# Patient Record
Sex: FEMALE | Race: WHITE | NOT HISPANIC OR LATINO | Employment: FULL TIME | URBAN - METROPOLITAN AREA
[De-identification: names, ages, dates, MRNs, and addresses within clinical notes are randomized per-mention and may not be internally consistent; named-entity substitution may affect disease eponyms.]

---

## 2023-03-06 LAB
ANION GAP IN SER/PLAS: 10 MMOL/L (ref 10–20)
APPEARANCE, URINE: NORMAL
BILIRUBIN, URINE: NEGATIVE
BLOOD, URINE: NEGATIVE
CALCIUM (MG/DL) IN SER/PLAS: 10 MG/DL (ref 8.6–10.3)
CARBON DIOXIDE, TOTAL (MMOL/L) IN SER/PLAS: 27 MMOL/L (ref 21–32)
CHLORIDE (MMOL/L) IN SER/PLAS: 108 MMOL/L (ref 98–107)
COLOR, URINE: YELLOW
CREATININE (MG/DL) IN SER/PLAS: 1.21 MG/DL (ref 0.5–1.05)
CREATININE (MG/DL) IN URINE: 179 MG/DL (ref 20–320)
GFR FEMALE: 57 ML/MIN/1.73M2
GLUCOSE (MG/DL) IN SER/PLAS: 87 MG/DL (ref 74–99)
GLUCOSE, URINE: NEGATIVE MG/DL
KETONES, URINE: NEGATIVE MG/DL
LEUKOCYTE ESTERASE, URINE: NEGATIVE
NITRITE, URINE: NEGATIVE
PH, URINE: 7 (ref 5–8)
POTASSIUM (MMOL/L) IN SER/PLAS: 4.6 MMOL/L (ref 3.5–5.3)
PROTEIN (MG/DL) IN URINE: 10 MG/DL (ref 5–24)
PROTEIN, URINE: NEGATIVE MG/DL
PROTEIN/CREATININE (MG/MG) IN URINE: 0.06 MG/MG CREAT (ref 0–0.17)
SODIUM (MMOL/L) IN SER/PLAS: 140 MMOL/L (ref 136–145)
SPECIFIC GRAVITY, URINE: 1.02 (ref 1–1.03)
UREA NITROGEN (MG/DL) IN SER/PLAS: 11 MG/DL (ref 6–23)
UROBILINOGEN, URINE: <2 MG/DL (ref 0–1.9)

## 2023-03-13 DIAGNOSIS — R73.03 PREDIABETES: Primary | ICD-10-CM

## 2023-03-13 RX ORDER — SEMAGLUTIDE 1.34 MG/ML
INJECTION, SOLUTION SUBCUTANEOUS
Qty: 3 ML | Refills: 11 | Status: SHIPPED | OUTPATIENT
Start: 2023-03-13 | End: 2023-04-17 | Stop reason: SDUPTHER

## 2023-04-13 PROBLEM — N81.4 UTEROVAGINAL PROLAPSE: Status: ACTIVE | Noted: 2023-04-13

## 2023-04-13 PROBLEM — N81.6 RECTOCELE, FEMALE: Status: ACTIVE | Noted: 2023-04-13

## 2023-04-13 PROBLEM — K59.09 CHRONIC CONSTIPATION: Status: ACTIVE | Noted: 2023-04-13

## 2023-04-13 PROBLEM — N81.11 MIDLINE CYSTOCELE: Status: ACTIVE | Noted: 2023-04-13

## 2023-04-13 PROBLEM — R94.4 DECREASED GFR: Status: ACTIVE | Noted: 2023-04-13

## 2023-04-13 PROBLEM — F41.1 GENERALIZED ANXIETY DISORDER: Status: ACTIVE | Noted: 2023-04-13

## 2023-04-13 PROBLEM — R60.0 LEG EDEMA: Status: ACTIVE | Noted: 2023-04-13

## 2023-04-13 PROBLEM — M79.10 MYALGIA: Status: ACTIVE | Noted: 2023-04-13

## 2023-04-13 PROBLEM — M25.519 CHRONIC SHOULDER PAIN: Status: ACTIVE | Noted: 2023-04-13

## 2023-04-13 PROBLEM — K21.9 GERD (GASTROESOPHAGEAL REFLUX DISEASE): Status: ACTIVE | Noted: 2023-04-13

## 2023-04-13 PROBLEM — G47.33 OBSTRUCTIVE SLEEP APNEA ON CPAP: Status: ACTIVE | Noted: 2023-04-13

## 2023-04-13 PROBLEM — R76.8 POSITIVE ANA (ANTINUCLEAR ANTIBODY): Status: ACTIVE | Noted: 2023-04-13

## 2023-04-13 PROBLEM — R32 INCONTINENCE IN FEMALE: Status: ACTIVE | Noted: 2023-04-13

## 2023-04-13 PROBLEM — E16.1 HYPERINSULINEMIA: Status: ACTIVE | Noted: 2023-04-13

## 2023-04-13 PROBLEM — R10.2 PAIN IN PELVIS: Status: ACTIVE | Noted: 2023-04-13

## 2023-04-13 PROBLEM — M54.50 CHRONIC LOW BACK PAIN: Status: ACTIVE | Noted: 2023-04-13

## 2023-04-13 PROBLEM — E55.9 VITAMIN D DEFICIENCY: Status: ACTIVE | Noted: 2023-04-13

## 2023-04-13 PROBLEM — N18.30 STAGE 3 CHRONIC KIDNEY DISEASE (MULTI): Status: ACTIVE | Noted: 2023-04-13

## 2023-04-13 PROBLEM — M79.643 HAND PAIN: Status: ACTIVE | Noted: 2023-04-13

## 2023-04-13 PROBLEM — G89.29 CHRONIC LOW BACK PAIN: Status: ACTIVE | Noted: 2023-04-13

## 2023-04-13 PROBLEM — G89.29 CHRONIC SHOULDER PAIN: Status: ACTIVE | Noted: 2023-04-13

## 2023-04-13 PROBLEM — E53.8 VITAMIN B12 DEFICIENCY: Status: ACTIVE | Noted: 2023-04-13

## 2023-04-13 PROBLEM — R25.2 MUSCLE CRAMPS: Status: ACTIVE | Noted: 2023-04-13

## 2023-04-13 PROBLEM — I10 BENIGN ESSENTIAL HYPERTENSION: Status: ACTIVE | Noted: 2023-04-13

## 2023-04-13 PROBLEM — E66.9 OBESITY WITH BODY MASS INDEX (BMI) OF 30.0 TO 39.9: Status: ACTIVE | Noted: 2023-04-13

## 2023-04-13 PROBLEM — M25.551 CHRONIC PAIN OF RIGHT HIP: Status: ACTIVE | Noted: 2023-04-13

## 2023-04-13 PROBLEM — G43.909 MIGRAINE HEADACHE: Status: ACTIVE | Noted: 2023-04-13

## 2023-04-13 PROBLEM — G89.29 CHRONIC PAIN OF RIGHT HIP: Status: ACTIVE | Noted: 2023-04-13

## 2023-04-13 PROBLEM — M25.50 POLYARTHRALGIA: Status: ACTIVE | Noted: 2023-04-13

## 2023-04-13 RX ORDER — HYDROXYCHLOROQUINE SULFATE 200 MG/1
1 TABLET, FILM COATED ORAL DAILY
COMMUNITY
Start: 2023-02-21 | End: 2024-04-29 | Stop reason: WASHOUT

## 2023-04-13 RX ORDER — ACYCLOVIR 200 MG/1
200 CAPSULE ORAL
COMMUNITY
Start: 2023-01-19 | End: 2024-04-29 | Stop reason: WASHOUT

## 2023-04-13 RX ORDER — SEMAGLUTIDE 1.34 MG/ML
INJECTION, SOLUTION SUBCUTANEOUS
COMMUNITY
Start: 2022-08-26 | End: 2023-04-15 | Stop reason: DRUGHIGH

## 2023-04-13 RX ORDER — BACLOFEN 20 MG
TABLET ORAL
COMMUNITY

## 2023-04-13 RX ORDER — LOSARTAN POTASSIUM 50 MG/1
1 TABLET ORAL DAILY
COMMUNITY
Start: 2022-12-11 | End: 2023-04-17 | Stop reason: SDUPTHER

## 2023-04-15 PROBLEM — G89.29 CHRONIC PAIN OF LEFT KNEE: Status: ACTIVE | Noted: 2023-01-04

## 2023-04-15 PROBLEM — M25.562 CHRONIC PAIN OF LEFT KNEE: Status: ACTIVE | Noted: 2023-01-04

## 2023-04-15 PROBLEM — M35.9 UNDIFFERENTIATED CONNECTIVE TISSUE DISEASE (MULTI): Status: ACTIVE | Noted: 2023-01-04

## 2023-04-15 NOTE — PROGRESS NOTES
"Subjective   Patient ID: Delores Grijalva is a 43 y.o. female who presents for Annual Exam.  She has lost 36 lbs in the past 11 months since starting Ozempic for prediabetes.     She is concerned about chronic ammonia exposure at work. 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         Current Outpatient Medications   Medication Sig Dispense Refill    acyclovir (Zovirax) 200 mg capsule Take 1 capsule (200 mg) by mouth 5 times a day.      magnesium oxide 500 mg tablet Take by mouth.      diclofenac sodium 1 % kit Apply 4 g topically in the morning and 4 g at noon and 4 g in the evening and 4 g before bedtime.      hydroxychloroquine (Plaquenil) 200 mg tablet Take 1 tablet (200 mg) by mouth once daily.      losartan (Cozaar) 25 mg tablet Take 1 tablet (25 mg) by mouth once daily. 90 tablet 3    semaglutide (Ozempic) 1 mg/dose (4 mg/3 mL) pen injector Inject 1 mg under the skin 1 (one) time per week. 3 mL 11     No current facility-administered medications for this visit.     Past Surgical History:   Procedure Laterality Date    OTHER SURGICAL HISTORY  09/25/2019    Hysterectomy    OTHER SURGICAL HISTORY  02/09/2022    Kidney biopsy     No family history on file.   Social History     Tobacco Use    Smoking status: Never    Smokeless tobacco: Never   Vaping Use    Vaping status: Never Used     Passive vaping exposure: Yes   Substance Use Topics    Alcohol use: Never    Drug use: Never        Objective     Visit Vitals  /84 (BP Location: Left arm, Patient Position: Sitting, BP Cuff Size: Adult)   Pulse 68   Temp 36.4 °C (97.6 °F) (Temporal)   Resp 16   Ht 1.626 m (5' 4\")   Wt 79.8 kg (176 lb)   SpO2 99%   BMI 30.21 kg/m²   Smoking Status Never   BSA 1.9 m²        Constitutional: Well nourished, well developed, appears stated age  Eyes: no scleral icterus, no conjunctival injection  Ears: normal external auditory canal, no retraction or bulging of tympanic " membranes. Possible serous effusion left TM  Neck: no thyromegaly  Cardiovascular: regular rate and rhythm, no leg edema  Respiratory: normal respiratory effort, clear to auscultation bilaterally  Musculoskeletal: No gross deformities appreciated  Skin: Warm, dry. No rashes  Neurologic: Alert, CNs II-XII grossly intact..  Psych: Appropriate mood and affect.        Assessment/Plan   Problem List Items Addressed This Visit          Circulatory    Benign essential hypertension     Controlled on losartan, continue         Relevant Medications    losartan (Cozaar) 25 mg tablet    Other Relevant Orders    TSH with reflex to Free T4 if abnormal (Completed)    Basic metabolic panel (Completed)       Genitourinary    Stage 3 chronic kidney disease (CMS/HCC)     She would prefer not to keep seeing nephrology since workup for CKD etiology was negative.  I will monitor her GFR twice a year.         Relevant Orders    Basic metabolic panel (Completed)       Endocrine/Metabolic    Prediabetes    Relevant Medications    semaglutide (Ozempic) 1 mg/dose (4 mg/3 mL) pen injector    Other Relevant Orders    TSH with reflex to Free T4 if abnormal (Completed)    Hemoglobin A1c    Class 1 obesity due to excess calories with serious comorbidity and body mass index (BMI) of 30.0 to 30.9 in adult     She has lost 36 lbs in the past 11 months since starting Ozempic for prediabetes          Other Visit Diagnoses       Annual physical exam    -  Primary    Baseline mammogram done 2021    Relevant Orders    Hemoglobin A1c    Environmental exposure        Relevant Orders    Ammonia (Completed)            Follow up 6 months.    Shania Rincon,

## 2023-04-15 NOTE — PATIENT INSTRUCTIONS
Thank you for choosing [facility] for your healthcare.   As always if you have any questions or concerns please do not hesitate to call our office at [phone] or through [employer].    Have a great day!  Shania Rincon, DO

## 2023-04-17 ENCOUNTER — OFFICE VISIT (OUTPATIENT)
Dept: PRIMARY CARE | Facility: CLINIC | Age: 43
End: 2023-04-17
Payer: COMMERCIAL

## 2023-04-17 ENCOUNTER — LAB (OUTPATIENT)
Dept: LAB | Facility: LAB | Age: 43
End: 2023-04-17
Payer: COMMERCIAL

## 2023-04-17 VITALS
HEIGHT: 64 IN | TEMPERATURE: 97.6 F | HEART RATE: 68 BPM | OXYGEN SATURATION: 99 % | DIASTOLIC BLOOD PRESSURE: 84 MMHG | WEIGHT: 176 LBS | SYSTOLIC BLOOD PRESSURE: 123 MMHG | BODY MASS INDEX: 30.05 KG/M2 | RESPIRATION RATE: 16 BRPM

## 2023-04-17 DIAGNOSIS — T75.89XA ENVIRONMENTAL EXPOSURE: ICD-10-CM

## 2023-04-17 DIAGNOSIS — Z00.00 ANNUAL PHYSICAL EXAM: ICD-10-CM

## 2023-04-17 DIAGNOSIS — Z00.00 ANNUAL PHYSICAL EXAM: Primary | ICD-10-CM

## 2023-04-17 DIAGNOSIS — N18.31 STAGE 3A CHRONIC KIDNEY DISEASE (MULTI): ICD-10-CM

## 2023-04-17 DIAGNOSIS — E66.09 CLASS 1 OBESITY DUE TO EXCESS CALORIES WITH SERIOUS COMORBIDITY AND BODY MASS INDEX (BMI) OF 30.0 TO 30.9 IN ADULT: ICD-10-CM

## 2023-04-17 DIAGNOSIS — I10 BENIGN ESSENTIAL HYPERTENSION: ICD-10-CM

## 2023-04-17 DIAGNOSIS — R73.03 PREDIABETES: ICD-10-CM

## 2023-04-17 PROBLEM — R60.0 EDEMA OF LOWER EXTREMITY: Status: ACTIVE | Noted: 2023-04-17

## 2023-04-17 PROBLEM — M25.519 SHOULDER PAIN: Status: ACTIVE | Noted: 2023-04-17

## 2023-04-17 PROBLEM — R32 URINARY INCONTINENCE: Status: ACTIVE | Noted: 2023-04-17

## 2023-04-17 PROBLEM — E66.811 CLASS 1 OBESITY DUE TO EXCESS CALORIES WITH SERIOUS COMORBIDITY AND BODY MASS INDEX (BMI) OF 30.0 TO 30.9 IN ADULT: Status: ACTIVE | Noted: 2023-04-17

## 2023-04-17 PROBLEM — R60.0 LEG EDEMA: Status: RESOLVED | Noted: 2023-04-13 | Resolved: 2023-04-17

## 2023-04-17 PROBLEM — R94.4 DECREASED GFR: Status: RESOLVED | Noted: 2023-04-13 | Resolved: 2023-04-17

## 2023-04-17 PROBLEM — R20.0 NUMBNESS OF HAND: Status: ACTIVE | Noted: 2023-04-17

## 2023-04-17 PROBLEM — K58.9 IRRITABLE BOWEL SYNDROME: Status: ACTIVE | Noted: 2023-04-17

## 2023-04-17 PROBLEM — R76.8 POSITIVE ANTINUCLEAR ANTIBODY: Status: ACTIVE | Noted: 2023-04-17

## 2023-04-17 PROBLEM — M79.603 PAIN IN UPPER LIMB: Status: ACTIVE | Noted: 2023-04-17

## 2023-04-17 LAB
AMMONIA (UMOL/L) IN PLASMA: 83 UMOL/L
ANION GAP IN SER/PLAS: 10 MMOL/L (ref 10–20)
CALCIUM (MG/DL) IN SER/PLAS: 9.6 MG/DL (ref 8.6–10.3)
CARBON DIOXIDE, TOTAL (MMOL/L) IN SER/PLAS: 25 MMOL/L (ref 21–32)
CHLORIDE (MMOL/L) IN SER/PLAS: 109 MMOL/L (ref 98–107)
CREATININE (MG/DL) IN SER/PLAS: 1.06 MG/DL (ref 0.5–1.05)
GFR FEMALE: 66 ML/MIN/1.73M2
GLUCOSE (MG/DL) IN SER/PLAS: 87 MG/DL (ref 74–99)
POTASSIUM (MMOL/L) IN SER/PLAS: 4.5 MMOL/L (ref 3.5–5.3)
SODIUM (MMOL/L) IN SER/PLAS: 139 MMOL/L (ref 136–145)
THYROTROPIN (MIU/L) IN SER/PLAS BY DETECTION LIMIT <= 0.05 MIU/L: 1.3 MIU/L (ref 0.44–3.98)
UREA NITROGEN (MG/DL) IN SER/PLAS: 11 MG/DL (ref 6–23)

## 2023-04-17 PROCEDURE — 1036F TOBACCO NON-USER: CPT | Performed by: FAMILY MEDICINE

## 2023-04-17 PROCEDURE — 99396 PREV VISIT EST AGE 40-64: CPT | Performed by: FAMILY MEDICINE

## 2023-04-17 PROCEDURE — 3079F DIAST BP 80-89 MM HG: CPT | Performed by: FAMILY MEDICINE

## 2023-04-17 PROCEDURE — 84443 ASSAY THYROID STIM HORMONE: CPT

## 2023-04-17 PROCEDURE — 83036 HEMOGLOBIN GLYCOSYLATED A1C: CPT

## 2023-04-17 PROCEDURE — 3074F SYST BP LT 130 MM HG: CPT | Performed by: FAMILY MEDICINE

## 2023-04-17 PROCEDURE — 3008F BODY MASS INDEX DOCD: CPT | Performed by: FAMILY MEDICINE

## 2023-04-17 PROCEDURE — 82140 ASSAY OF AMMONIA: CPT

## 2023-04-17 PROCEDURE — 36415 COLL VENOUS BLD VENIPUNCTURE: CPT

## 2023-04-17 PROCEDURE — 80048 BASIC METABOLIC PNL TOTAL CA: CPT

## 2023-04-17 RX ORDER — LOSARTAN POTASSIUM 25 MG/1
25 TABLET ORAL DAILY
Qty: 90 TABLET | Refills: 3 | Status: SHIPPED | OUTPATIENT
Start: 2023-04-17 | End: 2023-10-04 | Stop reason: SDUPTHER

## 2023-04-17 RX ORDER — SEMAGLUTIDE 1.34 MG/ML
1 INJECTION, SOLUTION SUBCUTANEOUS
Qty: 3 ML | Refills: 11 | Status: SHIPPED | OUTPATIENT
Start: 2023-04-17 | End: 2023-07-05 | Stop reason: DRUGHIGH

## 2023-04-17 SDOH — ECONOMIC STABILITY: FOOD INSECURITY: WITHIN THE PAST 12 MONTHS, YOU WORRIED THAT YOUR FOOD WOULD RUN OUT BEFORE YOU GOT MONEY TO BUY MORE.: NEVER TRUE

## 2023-04-17 SDOH — ECONOMIC STABILITY: FOOD INSECURITY: WITHIN THE PAST 12 MONTHS, THE FOOD YOU BOUGHT JUST DIDN'T LAST AND YOU DIDN'T HAVE MONEY TO GET MORE.: NEVER TRUE

## 2023-04-17 ASSESSMENT — PATIENT HEALTH QUESTIONNAIRE - PHQ9
SUM OF ALL RESPONSES TO PHQ9 QUESTIONS 1 & 2: 0
2. FEELING DOWN, DEPRESSED OR HOPELESS: NOT AT ALL
1. LITTLE INTEREST OR PLEASURE IN DOING THINGS: NOT AT ALL

## 2023-04-17 ASSESSMENT — LIFESTYLE VARIABLES
SKIP TO QUESTIONS 9-10: 1
HOW OFTEN DO YOU HAVE A DRINK CONTAINING ALCOHOL: NEVER
AUDIT-C TOTAL SCORE: 0
HOW MANY STANDARD DRINKS CONTAINING ALCOHOL DO YOU HAVE ON A TYPICAL DAY: PATIENT DOES NOT DRINK
HOW OFTEN DO YOU HAVE SIX OR MORE DRINKS ON ONE OCCASION: NEVER

## 2023-04-17 ASSESSMENT — PAIN SCALES - GENERAL: PAINLEVEL: 4

## 2023-04-18 ENCOUNTER — TELEPHONE (OUTPATIENT)
Dept: PRIMARY CARE | Facility: CLINIC | Age: 43
End: 2023-04-18
Payer: COMMERCIAL

## 2023-04-18 DIAGNOSIS — R79.89 INCREASED AMMONIA LEVEL: Primary | ICD-10-CM

## 2023-04-18 LAB
ESTIMATED AVERAGE GLUCOSE FOR HBA1C: 103 MG/DL
HEMOGLOBIN A1C/HEMOGLOBIN TOTAL IN BLOOD: 5.2 %

## 2023-04-18 NOTE — TELEPHONE ENCOUNTER
I want her to wear gloves at work constantly since we think that is the source of her exposure. The plan is to recheck the ammonia level in 1 week and I also added liver function tests to make sure her liver is working properly. Does not need to fast for the blood work. Thanks,  Shania Rincon, DO

## 2023-04-18 NOTE — TELEPHONE ENCOUNTER
Pt saw her bloodwork results on mychart and notice her ammonia levels are off. She is worried and is wondering what you recommend.

## 2023-04-18 NOTE — ASSESSMENT & PLAN NOTE
She would prefer not to keep seeing nephrology since workup for CKD etiology was negative.  I will monitor her GFR twice a year.

## 2023-04-24 ENCOUNTER — LAB (OUTPATIENT)
Dept: LAB | Facility: LAB | Age: 43
End: 2023-04-24
Payer: COMMERCIAL

## 2023-04-24 DIAGNOSIS — R79.89 INCREASED AMMONIA LEVEL: ICD-10-CM

## 2023-04-24 LAB
ALANINE AMINOTRANSFERASE (SGPT) (U/L) IN SER/PLAS: 14 U/L (ref 7–45)
ALBUMIN (G/DL) IN SER/PLAS: 4.2 G/DL (ref 3.4–5)
ALKALINE PHOSPHATASE (U/L) IN SER/PLAS: 48 U/L (ref 33–110)
AMMONIA (UMOL/L) IN PLASMA: 31 UMOL/L
ASPARTATE AMINOTRANSFERASE (SGOT) (U/L) IN SER/PLAS: 16 U/L (ref 9–39)
BILIRUBIN DIRECT (MG/DL) IN SER/PLAS: 0.1 MG/DL (ref 0–0.3)
BILIRUBIN TOTAL (MG/DL) IN SER/PLAS: 0.7 MG/DL (ref 0–1.2)
PROTEIN TOTAL: 6.2 G/DL (ref 6.4–8.2)

## 2023-04-24 PROCEDURE — 80076 HEPATIC FUNCTION PANEL: CPT

## 2023-04-24 PROCEDURE — 36415 COLL VENOUS BLD VENIPUNCTURE: CPT

## 2023-04-24 PROCEDURE — 82140 ASSAY OF AMMONIA: CPT

## 2023-05-01 ENCOUNTER — LAB (OUTPATIENT)
Dept: LAB | Facility: LAB | Age: 43
End: 2023-05-01
Payer: COMMERCIAL

## 2023-05-01 DIAGNOSIS — R79.89 INCREASED AMMONIA LEVEL: ICD-10-CM

## 2023-05-01 LAB — AMMONIA (UMOL/L) IN PLASMA: 31 UMOL/L

## 2023-05-01 PROCEDURE — 36415 COLL VENOUS BLD VENIPUNCTURE: CPT

## 2023-05-01 PROCEDURE — 82140 ASSAY OF AMMONIA: CPT

## 2023-05-02 ENCOUNTER — TELEPHONE (OUTPATIENT)
Dept: PRIMARY CARE | Facility: CLINIC | Age: 43
End: 2023-05-02
Payer: COMMERCIAL

## 2023-05-02 NOTE — TELEPHONE ENCOUNTER
----- Message from Shania Rincon DO sent at 5/1/2023  7:24 PM EDT -----  Repeat ammonia was normal and the same as 1 week ago - 31 (normal is less than 53). Thanks,  Shania Rincon DO

## 2023-06-07 ENCOUNTER — TELEPHONE (OUTPATIENT)
Dept: PRIMARY CARE | Facility: CLINIC | Age: 43
End: 2023-06-07
Payer: COMMERCIAL

## 2023-06-07 NOTE — TELEPHONE ENCOUNTER
Pt says that her BP has been very high lately especially in the morning, with the bottom number being the the 100's. Pt took an extra half last night. Today it was 169/109. Pt is wanting to know if she should take an extra half of a pill or what you would advise?

## 2023-06-07 NOTE — TELEPHONE ENCOUNTER
I recommend increasing her losartan to 2 tablets daily and to call us in a week with update on home BP readings. Thanks,  Shania Rincon, DO

## 2023-07-05 ENCOUNTER — TELEPHONE (OUTPATIENT)
Dept: PRIMARY CARE | Facility: CLINIC | Age: 43
End: 2023-07-05
Payer: COMMERCIAL

## 2023-07-05 VITALS — BODY MASS INDEX: 28.32 KG/M2 | WEIGHT: 165 LBS

## 2023-07-05 DIAGNOSIS — R73.03 PREDIABETES: ICD-10-CM

## 2023-07-05 DIAGNOSIS — R73.03 PREDIABETES: Primary | ICD-10-CM

## 2023-07-05 NOTE — TELEPHONE ENCOUNTER
Called patient, notified of message, patient expressed verbal understanding had no questions at this time. Patient said that her current weight is 165lbs.

## 2023-07-05 NOTE — TELEPHONE ENCOUNTER
2 mg dose submitted. Can you let her know and find out what her current weight is? Thanks,  Shania Rincon, DO

## 2023-07-05 NOTE — TELEPHONE ENCOUNTER
PATIENT SAID THAT YOU TOLD HER TO CALL IF SHE WANTED TO INCREASE THE DOSAGE OF HER OZEMPIC. PT WOULD LIKE TO INCREASE IT TO 2. [employer].

## 2023-07-05 NOTE — TELEPHONE ENCOUNTER
Prior authorization request received via fax for OZEMPIC    Form given to: PLACED IN YOUR BOX ON 7/5/23

## 2023-07-06 RX ORDER — SEMAGLUTIDE 1.34 MG/ML
1 INJECTION, SOLUTION SUBCUTANEOUS
Qty: 3 ML | Refills: 11 | Status: SHIPPED | OUTPATIENT
Start: 2023-07-06 | End: 2023-10-04 | Stop reason: SDUPTHER

## 2023-07-06 NOTE — TELEPHONE ENCOUNTER
Insurance denied the Ozempic 2 mg. Her insurance is now only covering Ozempic for patients with diabetes which she doesn't have. Her insurance approval for the 1 mg dose is still valid until the end of September. So she should be able to fill the 1 mg until that time and after that, he insurance will no longer cover the Ozempic at all for her.  Can you let her know? Thanks,  Shania Rincon, DO

## 2023-08-30 ENCOUNTER — TELEPHONE (OUTPATIENT)
Dept: PRIMARY CARE | Facility: CLINIC | Age: 43
End: 2023-08-30
Payer: COMMERCIAL

## 2023-08-30 NOTE — TELEPHONE ENCOUNTER
Prior authorization request received via fax for Ozempic     Form given to: Placed in  mailbox 8/30/23

## 2023-08-31 NOTE — TELEPHONE ENCOUNTER
PA done on CMM and denied because patient does not have diabetes. Patient previously made aware of this.  Shania Rincon, DO

## 2023-09-14 ENCOUNTER — TELEPHONE (OUTPATIENT)
Dept: PRIMARY CARE | Facility: CLINIC | Age: 43
End: 2023-09-14
Payer: COMMERCIAL

## 2023-09-14 NOTE — TELEPHONE ENCOUNTER
Prior authorization request received via fax for OZEMPIC    Form given to: PLACED IN YOUR BOX ON 9/14/23.

## 2023-09-28 ENCOUNTER — TELEPHONE (OUTPATIENT)
Dept: PRIMARY CARE | Facility: CLINIC | Age: 43
End: 2023-09-28
Payer: COMMERCIAL

## 2023-09-28 DIAGNOSIS — I10 BENIGN ESSENTIAL HYPERTENSION: Primary | ICD-10-CM

## 2023-09-28 DIAGNOSIS — R79.89 INCREASED AMMONIA LEVEL: ICD-10-CM

## 2023-09-28 NOTE — TELEPHONE ENCOUNTER
Patient is calling to see if lab orders can be placed.  Patient is asking if leo liver and ammonia levels along with kidney can be checked    NOV 10/4

## 2023-10-02 ENCOUNTER — LAB (OUTPATIENT)
Dept: LAB | Facility: LAB | Age: 43
End: 2023-10-02
Payer: COMMERCIAL

## 2023-10-02 DIAGNOSIS — R79.89 INCREASED AMMONIA LEVEL: ICD-10-CM

## 2023-10-02 DIAGNOSIS — I10 BENIGN ESSENTIAL HYPERTENSION: ICD-10-CM

## 2023-10-02 LAB
ALBUMIN SERPL BCP-MCNC: 4.1 G/DL (ref 3.4–5)
ALP SERPL-CCNC: 47 U/L (ref 33–110)
ALT SERPL W P-5'-P-CCNC: 12 U/L (ref 7–45)
AMMONIA PLAS-SCNC: 27 UMOL/L (ref 16–53)
ANION GAP SERPL CALC-SCNC: 9 MMOL/L (ref 10–20)
AST SERPL W P-5'-P-CCNC: 13 U/L (ref 9–39)
BILIRUB SERPL-MCNC: 0.5 MG/DL (ref 0–1.2)
BUN SERPL-MCNC: 11 MG/DL (ref 6–23)
CALCIUM SERPL-MCNC: 10 MG/DL (ref 8.6–10.3)
CHLORIDE SERPL-SCNC: 108 MMOL/L (ref 98–107)
CO2 SERPL-SCNC: 25 MMOL/L (ref 21–32)
CREAT SERPL-MCNC: 1.14 MG/DL (ref 0.5–1.05)
GFR SERPL CREATININE-BSD FRML MDRD: 61 ML/MIN/1.73M*2
GLUCOSE SERPL-MCNC: 87 MG/DL (ref 74–99)
POTASSIUM SERPL-SCNC: 4.1 MMOL/L (ref 3.5–5.3)
PROT SERPL-MCNC: 6.2 G/DL (ref 6.4–8.2)
SODIUM SERPL-SCNC: 138 MMOL/L (ref 136–145)

## 2023-10-02 PROCEDURE — 36415 COLL VENOUS BLD VENIPUNCTURE: CPT

## 2023-10-04 ENCOUNTER — OFFICE VISIT (OUTPATIENT)
Dept: PRIMARY CARE | Facility: CLINIC | Age: 43
End: 2023-10-04
Payer: COMMERCIAL

## 2023-10-04 VITALS
DIASTOLIC BLOOD PRESSURE: 80 MMHG | SYSTOLIC BLOOD PRESSURE: 126 MMHG | HEART RATE: 82 BPM | BODY MASS INDEX: 26.98 KG/M2 | TEMPERATURE: 97.2 F | HEIGHT: 64 IN | OXYGEN SATURATION: 98 % | WEIGHT: 158 LBS | RESPIRATION RATE: 16 BRPM

## 2023-10-04 DIAGNOSIS — R07.81 RIB PAIN ON LEFT SIDE: ICD-10-CM

## 2023-10-04 DIAGNOSIS — E66.3 OVERWEIGHT (BMI 25.0-29.9): ICD-10-CM

## 2023-10-04 DIAGNOSIS — R73.03 PREDIABETES: ICD-10-CM

## 2023-10-04 DIAGNOSIS — T75.3XXA MOTION SICKNESS, INITIAL ENCOUNTER: Primary | ICD-10-CM

## 2023-10-04 DIAGNOSIS — I10 BENIGN ESSENTIAL HYPERTENSION: ICD-10-CM

## 2023-10-04 PROBLEM — N18.2 STAGE 2 CHRONIC KIDNEY DISEASE: Chronic | Status: ACTIVE | Noted: 2023-04-13

## 2023-10-04 PROBLEM — R76.8 POSITIVE ANTINUCLEAR ANTIBODY: Status: RESOLVED | Noted: 2023-04-17 | Resolved: 2023-10-04

## 2023-10-04 PROBLEM — M25.532 LEFT WRIST PAIN: Status: ACTIVE | Noted: 2023-10-04

## 2023-10-04 PROBLEM — R60.0 LEG EDEMA: Status: ACTIVE | Noted: 2023-10-04

## 2023-10-04 PROBLEM — R60.0 LEG EDEMA: Status: RESOLVED | Noted: 2023-10-04 | Resolved: 2023-10-04

## 2023-10-04 PROBLEM — E66.9 OBESITY WITH BODY MASS INDEX (BMI) OF 30.0 TO 39.9: Status: RESOLVED | Noted: 2023-04-13 | Resolved: 2023-10-04

## 2023-10-04 PROBLEM — E66.09 CLASS 1 OBESITY DUE TO EXCESS CALORIES WITH SERIOUS COMORBIDITY AND BODY MASS INDEX (BMI) OF 30.0 TO 30.9 IN ADULT: Status: RESOLVED | Noted: 2023-04-17 | Resolved: 2023-10-04

## 2023-10-04 PROBLEM — N18.2 STAGE 2 CHRONIC KIDNEY DISEASE: Status: ACTIVE | Noted: 2023-04-13

## 2023-10-04 PROBLEM — E66.811 CLASS 1 OBESITY DUE TO EXCESS CALORIES WITH SERIOUS COMORBIDITY AND BODY MASS INDEX (BMI) OF 30.0 TO 30.9 IN ADULT: Status: RESOLVED | Noted: 2023-04-17 | Resolved: 2023-10-04

## 2023-10-04 PROBLEM — R94.4 DECREASED GFR: Status: ACTIVE | Noted: 2023-10-04

## 2023-10-04 PROBLEM — M77.8 TENDINITIS OF LEFT WRIST: Status: ACTIVE | Noted: 2023-10-04

## 2023-10-04 PROCEDURE — 3074F SYST BP LT 130 MM HG: CPT | Performed by: FAMILY MEDICINE

## 2023-10-04 PROCEDURE — 3079F DIAST BP 80-89 MM HG: CPT | Performed by: FAMILY MEDICINE

## 2023-10-04 PROCEDURE — 99214 OFFICE O/P EST MOD 30 MIN: CPT | Performed by: FAMILY MEDICINE

## 2023-10-04 PROCEDURE — 3008F BODY MASS INDEX DOCD: CPT | Performed by: FAMILY MEDICINE

## 2023-10-04 PROCEDURE — 1036F TOBACCO NON-USER: CPT | Performed by: FAMILY MEDICINE

## 2023-10-04 RX ORDER — SEMAGLUTIDE 1.34 MG/ML
1 INJECTION, SOLUTION SUBCUTANEOUS
Qty: 3 ML | Refills: 11 | Status: SHIPPED | OUTPATIENT
Start: 2023-10-04 | End: 2023-11-10

## 2023-10-04 RX ORDER — SCOLOPAMINE TRANSDERMAL SYSTEM 1 MG/1
1 PATCH, EXTENDED RELEASE TRANSDERMAL
Qty: 12 PATCH | Refills: 0 | Status: SHIPPED | OUTPATIENT
Start: 2023-10-04 | End: 2023-12-03

## 2023-10-04 RX ORDER — LOSARTAN POTASSIUM 25 MG/1
25 TABLET ORAL DAILY
Qty: 90 TABLET | Refills: 3 | Status: SHIPPED | OUTPATIENT
Start: 2023-10-04 | End: 2023-11-01

## 2023-10-04 SDOH — ECONOMIC STABILITY: FOOD INSECURITY: WITHIN THE PAST 12 MONTHS, THE FOOD YOU BOUGHT JUST DIDN'T LAST AND YOU DIDN'T HAVE MONEY TO GET MORE.: NEVER TRUE

## 2023-10-04 SDOH — ECONOMIC STABILITY: FOOD INSECURITY: WITHIN THE PAST 12 MONTHS, YOU WORRIED THAT YOUR FOOD WOULD RUN OUT BEFORE YOU GOT MONEY TO BUY MORE.: NEVER TRUE

## 2023-10-04 ASSESSMENT — LIFESTYLE VARIABLES
HOW OFTEN DO YOU HAVE A DRINK CONTAINING ALCOHOL: NEVER
HOW OFTEN DO YOU HAVE SIX OR MORE DRINKS ON ONE OCCASION: NEVER
SKIP TO QUESTIONS 9-10: 1
HOW MANY STANDARD DRINKS CONTAINING ALCOHOL DO YOU HAVE ON A TYPICAL DAY: PATIENT DOES NOT DRINK
AUDIT-C TOTAL SCORE: 0

## 2023-10-04 ASSESSMENT — PATIENT HEALTH QUESTIONNAIRE - PHQ9
1. LITTLE INTEREST OR PLEASURE IN DOING THINGS: NOT AT ALL
2. FEELING DOWN, DEPRESSED OR HOPELESS: NOT AT ALL
SUM OF ALL RESPONSES TO PHQ9 QUESTIONS 1 & 2: 0

## 2023-10-04 ASSESSMENT — PAIN SCALES - GENERAL: PAINLEVEL: 0-NO PAIN

## 2023-10-04 NOTE — PROGRESS NOTES
"Subjective   Patient ID: Delores Grijalva is a 43 y.o. female who presents for Follow-up (6 month follow-up. Going on cruise next month, asking about sea sickness.), Chest Pain (L side rib pain X 1 month.  Pain is 0/10 right now but can be 9/10 while active.), and Obesity (Would like to discuss weight loss progress. ).  Her insurance is no longer covering Ozmepic. She lost 54 lbs from 5/2022 to now with the medication.     She is having left lower posterior rib pain for about 1 month. It is most severe after longer work days. No injury.        Current Outpatient Medications   Medication Sig Dispense Refill    magnesium oxide 500 mg tablet Take by mouth.      acyclovir (Zovirax) 200 mg capsule Take 1 capsule (200 mg) by mouth 5 times a day.      hydroxychloroquine (Plaquenil) 200 mg tablet Take 1 tablet (200 mg) by mouth once daily.      losartan (Cozaar) 25 mg tablet Take 1 tablet (25 mg) by mouth once daily. 90 tablet 3    scopolamine (Transderm-Scop) 1 mg over 3 days patch 3 day Place 1 patch over 72 hours on the skin every 3rd day if needed (motion sickness). Apply 8-12 hours prior to boarding ship. 12 patch 0    semaglutide (Ozempic) 1 mg/dose (4 mg/3 mL) pen injector Inject 1 mg under the skin 1 (one) time per week. 3 mL 11     No current facility-administered medications for this visit.       Objective     Visit Vitals  /80 (BP Location: Left arm, Patient Position: Sitting, BP Cuff Size: Adult)   Pulse 82   Temp 36.2 °C (97.2 °F)   Resp 16   Ht 1.626 m (5' 4\")   Wt 71.7 kg (158 lb)   SpO2 98%   BMI 27.12 kg/m²   Smoking Status Never   BSA 1.8 m²        Constitutional: Well nourished, well developed, appears stated age  Eyes: no scleral icterus, no conjunctival injection  Ears: normal external auditory canal, no retraction or bulging of tympanic membranes  Neck: no thyromegaly  Cardiovascular: regular rate and rhythm, no leg edema  Respiratory: normal respiratory effort, clear to auscultation " bilaterally  Musculoskeletal: No gross deformities appreciated  Skin: Warm, dry. No rashes  Neurologic: Alert, CNs II-XII grossly intact..  Psych: Appropriate mood and affect.        Assessment/Plan   Problem List Items Addressed This Visit       Prediabetes     Controlled on Ozempic         Relevant Medications    semaglutide (Ozempic) 1 mg/dose (4 mg/3 mL) pen injector    Benign essential hypertension (Chronic)     Controlled on losartan, continue         Relevant Medications    losartan (Cozaar) 25 mg tablet    Overweight (BMI 25.0-29.9)     No longer obese  Has lost 54 lbs in the past 1.5 years since starting Ozempic 5/2022. No longer being covered by insurance.           Other Visit Diagnoses       Motion sickness, initial encounter    -  Primary    Relevant Medications    scopolamine (Transderm-Scop) 1 mg over 3 days patch 3 day    Rib pain on left side        Suspect musclar   Recommended stretching area prior to work day          All pertinent lab work and results were reviewed with patient.     Follow up 6 months.    Shania Rincon, DO

## 2023-10-04 NOTE — ASSESSMENT & PLAN NOTE
She would prefer not to keep seeing nephrology since workup for CKD etiology was negative.  I will monitor her GFR twice a year.  Improved from stage 3a to stage 2 on recent labs

## 2023-10-04 NOTE — PATIENT INSTRUCTIONS
Thank you for choosing [facility] for your healthcare.   As always if you have any questions or concerns please do not hesitate to call our office at [phone] or through [facility].    Have a great day!  Shania Rincon, DO

## 2023-10-04 NOTE — ASSESSMENT & PLAN NOTE
No longer obese  Has lost 54 lbs in the past 1.5 years since starting Ozempic 5/2022. No longer being covered by insurance.

## 2023-10-23 ENCOUNTER — TELEPHONE (OUTPATIENT)
Dept: PRIMARY CARE | Facility: CLINIC | Age: 43
End: 2023-10-23
Payer: COMMERCIAL

## 2023-11-01 DIAGNOSIS — I10 BENIGN ESSENTIAL HYPERTENSION: ICD-10-CM

## 2023-11-01 RX ORDER — LOSARTAN POTASSIUM 25 MG/1
25 TABLET ORAL DAILY
Qty: 90 TABLET | Refills: 3 | Status: SHIPPED | OUTPATIENT
Start: 2023-11-01 | End: 2024-10-31

## 2023-11-08 ENCOUNTER — TELEPHONE (OUTPATIENT)
Dept: PRIMARY CARE | Facility: CLINIC | Age: 43
End: 2023-11-08
Payer: COMMERCIAL

## 2023-11-08 DIAGNOSIS — R73.03 PREDIABETES: Primary | Chronic | ICD-10-CM

## 2023-11-08 NOTE — TELEPHONE ENCOUNTER
Relayed message to the patient, patient would like to know if there is a pill form for this med or if it is only injection

## 2023-11-08 NOTE — TELEPHONE ENCOUNTER
Her insurance won't cover it because she doesn't have diabetes (its the same class of drug as the Ozempic). It will still help with weight loss if she is ok paying out of pocket for it. Thanks,  Shania Rincon, DO

## 2023-11-08 NOTE — TELEPHONE ENCOUNTER
PATIENT CALLED IN TO SEE IF YOU CAN GIVE HER THE NAME OF THE [place] PH[facility]Y THAT YOU MENTIONED TO HER. SHE ALSO WANTED TO KNOW IF THERE IS ANOTHER KIND OF MED THAT MAYBE CAN BE ORDERED THAT MAY BE LESS EXPENSIVE?    PLEASE ADVISE

## 2023-11-08 NOTE — TELEPHONE ENCOUNTER
All of the medications similar to Ozempic are all equally expensive - usually over $800 per month.   [facility] is the name of the [place] pharmacy that some of my family members use. They may have Ozempic or similar medication for cheaper.   Thanks,  Shania Rincon, DO

## 2023-11-09 NOTE — TELEPHONE ENCOUNTER
Sorry, yes that was a typo. They won't cover it because she does not have diabetes and it is a diabetes medication. Thanks,  Shania Rincon, DO

## 2023-11-10 RX ORDER — ORAL SEMAGLUTIDE 7 MG/1
7 TABLET ORAL DAILY
Qty: 90 TABLET | Refills: 3 | Status: SHIPPED | OUTPATIENT
Start: 2023-11-10 | End: 2024-01-25 | Stop reason: SDUPTHER

## 2023-11-10 NOTE — TELEPHONE ENCOUNTER
Pt is concerned that going off Ozempic is causing her headaches. Pt is having migraines and isn't sure. Please advise.

## 2024-01-24 ENCOUNTER — TELEPHONE (OUTPATIENT)
Dept: PRIMARY CARE | Facility: CLINIC | Age: 44
End: 2024-01-24
Payer: COMMERCIAL

## 2024-01-24 DIAGNOSIS — R73.03 PREDIABETES: Chronic | ICD-10-CM

## 2024-01-24 NOTE — TELEPHONE ENCOUNTER
PATIENT CALLED IN TO SEE IF YOU COULD CALL IN A REFILL ON THE RYBELSUS AS A 14MG TABLET INSTEAD OF THE 7 MG TO THE [facility] AND SHE WOULD JUST CUT THEM IN HALF. THIS WOULD BE A LITTLE CHEAPER FOR HER    PLEASE ADVISE

## 2024-04-06 DIAGNOSIS — R73.03 PREDIABETES: ICD-10-CM

## 2024-04-08 RX ORDER — SEMAGLUTIDE 1.34 MG/ML
INJECTION, SOLUTION SUBCUTANEOUS
Qty: 3 ML | Refills: 0 | OUTPATIENT
Start: 2024-04-08

## 2024-04-23 ENCOUNTER — TELEPHONE (OUTPATIENT)
Dept: PRIMARY CARE | Facility: CLINIC | Age: 44
End: 2024-04-23

## 2024-04-23 DIAGNOSIS — Z13.1 SCREENING FOR DIABETES MELLITUS: ICD-10-CM

## 2024-04-23 DIAGNOSIS — R79.89 INCREASED AMMONIA LEVEL: ICD-10-CM

## 2024-04-23 DIAGNOSIS — Z11.59 NEED FOR HEPATITIS C SCREENING TEST: Primary | ICD-10-CM

## 2024-04-23 DIAGNOSIS — Z13.220 SCREENING FOR CHOLESTEROL LEVEL: ICD-10-CM

## 2024-04-23 DIAGNOSIS — I10 BENIGN ESSENTIAL HYPERTENSION: Chronic | ICD-10-CM

## 2024-04-23 NOTE — TELEPHONE ENCOUNTER
Order for fasting labs placed. Please fast for 8 hours for the blood work. Water and black coffee is fine. You do not need an appointment to have your labs done.   Thanks,  Shania Rincon, DO

## 2024-04-23 NOTE — TELEPHONE ENCOUNTER
Patient calling to request blood work orders for upcoming appointment on  4/29/ OK to leave a VM.

## 2024-04-28 NOTE — PROGRESS NOTES
"Subjective   Patient ID: Delores Grijalva is a 44 y.o. female who presents for Follow-up (6 month folow-up. Discuss stimulator. ), Weight Management, and Annual Exam.  She is having a weird sensation from her bladder stimulator. I recommended she schedule follow up with urology.         Patient Care Team:  Shania Rincon DO as PCP - General  Shania Rincon DO as PCP - [facility] PCP  Mehrdad Roper MD as Consulting Physician (Rheumatology)  Pacheco Valadez MD as Consulting Physician (Nephrology)    Current Outpatient Medications   Medication Sig Dispense Refill    losartan (Cozaar) 25 mg tablet Take 1 tablet (25 mg) by mouth once daily. 90 tablet 3    magnesium oxide 500 mg tablet Take by mouth.      semaglutide (Rybelsus) 14 mg tablet tablet Take 1 tablet (14 mg) by mouth once daily. 90 tablet 3     No current facility-administered medications for this visit.       Objective     Visit Vitals  /80 (BP Location: Right arm, Patient Position: Sitting, BP Cuff Size: Large adult)   Pulse 68   Temp 36.4 °C (97.5 °F)   Resp 18   Ht 1.626 m (5' 4\")   Wt 76.7 kg (169 lb)   SpO2 100%   BMI 29.01 kg/m²   OB Status Hysterectomy   Smoking Status Never   BSA 1.86 m²        Constitutional: Well nourished, well developed, appears stated age  Eyes: no scleral icterus, no conjunctival injection  Ears: normal external auditory canal, no retraction or bulging of tympanic membranes  Neck: no thyromegaly  Cardiovascular: regular rate and rhythm, no leg edema  Respiratory: normal respiratory effort, clear to auscultation bilaterally  Musculoskeletal: No gross deformities appreciated  Skin: Warm, dry. No rashes  Neurologic: Alert, CNs II-XII grossly intact..  Psych: Appropriate mood and affect.        Assessment/Plan   Problem List Items Addressed This Visit       Benign essential hypertension (Chronic)     Controlled on losartan, continue         Overweight (BMI 25.0-29.9) (Chronic)     No longer obese  Lost 54 lbs on " Ozempic in 1.5 years until insurance stopped covering it.  Up 11 lbs in past 6 months, she will be starting Rybelsus 14 mg tomorrow (getting from [facility] due to cost)            Other Visit Diagnoses       Annual physical exam    -  Primary    yearly labs ordered, results pending  Mammo every other year  crealyticst message sent to patient to offer colon cancer screening          Follow up 6 months.    Shania Rincon, DO

## 2024-04-28 NOTE — PATIENT INSTRUCTIONS
Thank you for choosing [facility] for your healthcare.   As always if you have any questions or concerns please do not hesitate to call our office at [phone] or through [employer].    Have a great day!  Shania Rincon, DO

## 2024-04-29 ENCOUNTER — OFFICE VISIT (OUTPATIENT)
Dept: PRIMARY CARE | Facility: CLINIC | Age: 44
End: 2024-04-29
Payer: COMMERCIAL

## 2024-04-29 ENCOUNTER — PATIENT MESSAGE (OUTPATIENT)
Dept: PRIMARY CARE | Facility: CLINIC | Age: 44
End: 2024-04-29

## 2024-04-29 ENCOUNTER — LAB (OUTPATIENT)
Dept: LAB | Facility: LAB | Age: 44
End: 2024-04-29
Payer: COMMERCIAL

## 2024-04-29 VITALS
DIASTOLIC BLOOD PRESSURE: 80 MMHG | OXYGEN SATURATION: 100 % | HEIGHT: 64 IN | TEMPERATURE: 97.5 F | SYSTOLIC BLOOD PRESSURE: 124 MMHG | RESPIRATION RATE: 18 BRPM | WEIGHT: 169 LBS | HEART RATE: 68 BPM | BODY MASS INDEX: 28.85 KG/M2

## 2024-04-29 DIAGNOSIS — E66.3 OVERWEIGHT (BMI 25.0-29.9): ICD-10-CM

## 2024-04-29 DIAGNOSIS — R79.89 INCREASED AMMONIA LEVEL: ICD-10-CM

## 2024-04-29 DIAGNOSIS — E53.8 VITAMIN B12 DEFICIENCY: Primary | ICD-10-CM

## 2024-04-29 DIAGNOSIS — I10 BENIGN ESSENTIAL HYPERTENSION: Chronic | ICD-10-CM

## 2024-04-29 DIAGNOSIS — E55.9 VITAMIN D DEFICIENCY: ICD-10-CM

## 2024-04-29 DIAGNOSIS — Z00.00 ANNUAL PHYSICAL EXAM: Primary | ICD-10-CM

## 2024-04-29 DIAGNOSIS — Z13.1 SCREENING FOR DIABETES MELLITUS: ICD-10-CM

## 2024-04-29 DIAGNOSIS — N18.2 STAGE 2 CHRONIC KIDNEY DISEASE: Chronic | ICD-10-CM

## 2024-04-29 DIAGNOSIS — Z13.220 SCREENING FOR CHOLESTEROL LEVEL: ICD-10-CM

## 2024-04-29 DIAGNOSIS — Z11.59 NEED FOR HEPATITIS C SCREENING TEST: ICD-10-CM

## 2024-04-29 LAB
ALBUMIN SERPL BCP-MCNC: 4 G/DL (ref 3.4–5)
ALP SERPL-CCNC: 39 U/L (ref 33–110)
ALT SERPL W P-5'-P-CCNC: 17 U/L (ref 7–45)
AMMONIA PLAS-SCNC: 15 UMOL/L (ref 16–53)
ANION GAP SERPL CALC-SCNC: 10 MMOL/L (ref 10–20)
AST SERPL W P-5'-P-CCNC: 17 U/L (ref 9–39)
BILIRUB SERPL-MCNC: 0.4 MG/DL (ref 0–1.2)
BUN SERPL-MCNC: 15 MG/DL (ref 6–23)
CALCIUM SERPL-MCNC: 9.7 MG/DL (ref 8.6–10.3)
CHLORIDE SERPL-SCNC: 110 MMOL/L (ref 98–107)
CHOLEST SERPL-MCNC: 164 MG/DL (ref 0–199)
CHOLESTEROL/HDL RATIO: 2.9
CO2 SERPL-SCNC: 23 MMOL/L (ref 21–32)
CREAT SERPL-MCNC: 1.23 MG/DL (ref 0.5–1.05)
CREAT UR-MCNC: 79.1 MG/DL (ref 20–320)
EGFRCR SERPLBLD CKD-EPI 2021: 55 ML/MIN/1.73M*2
ERYTHROCYTE [DISTWIDTH] IN BLOOD BY AUTOMATED COUNT: 12.8 % (ref 11.5–14.5)
EST. AVERAGE GLUCOSE BLD GHB EST-MCNC: 103 MG/DL
GLUCOSE SERPL-MCNC: 85 MG/DL (ref 74–99)
HBA1C MFR BLD: 5.2 %
HCT VFR BLD AUTO: 42.3 % (ref 36–46)
HCV AB SER QL: NONREACTIVE
HDLC SERPL-MCNC: 56.1 MG/DL
HGB BLD-MCNC: 13.9 G/DL (ref 12–16)
LDLC SERPL CALC-MCNC: 99 MG/DL
MCH RBC QN AUTO: 31.3 PG (ref 26–34)
MCHC RBC AUTO-ENTMCNC: 32.9 G/DL (ref 32–36)
MCV RBC AUTO: 95 FL (ref 80–100)
MICROALBUMIN UR-MCNC: <7 MG/L
MICROALBUMIN/CREAT UR: NORMAL MG/G{CREAT}
NON HDL CHOLESTEROL: 108 MG/DL (ref 0–149)
NRBC BLD-RTO: 0 /100 WBCS (ref 0–0)
PLATELET # BLD AUTO: 204 X10*3/UL (ref 150–450)
POTASSIUM SERPL-SCNC: 5 MMOL/L (ref 3.5–5.3)
PROT SERPL-MCNC: 6 G/DL (ref 6.4–8.2)
RBC # BLD AUTO: 4.44 X10*6/UL (ref 4–5.2)
SODIUM SERPL-SCNC: 138 MMOL/L (ref 136–145)
TRIGL SERPL-MCNC: 44 MG/DL (ref 0–149)
VLDL: 9 MG/DL (ref 0–40)
WBC # BLD AUTO: 4.8 X10*3/UL (ref 4.4–11.3)

## 2024-04-29 PROCEDURE — 80061 LIPID PANEL: CPT

## 2024-04-29 PROCEDURE — 86803 HEPATITIS C AB TEST: CPT

## 2024-04-29 PROCEDURE — 1036F TOBACCO NON-USER: CPT | Performed by: FAMILY MEDICINE

## 2024-04-29 PROCEDURE — 36415 COLL VENOUS BLD VENIPUNCTURE: CPT

## 2024-04-29 PROCEDURE — 82570 ASSAY OF URINE CREATININE: CPT

## 2024-04-29 PROCEDURE — 82043 UR ALBUMIN QUANTITATIVE: CPT

## 2024-04-29 PROCEDURE — 80053 COMPREHEN METABOLIC PANEL: CPT

## 2024-04-29 PROCEDURE — 82140 ASSAY OF AMMONIA: CPT

## 2024-04-29 PROCEDURE — 3074F SYST BP LT 130 MM HG: CPT | Performed by: FAMILY MEDICINE

## 2024-04-29 PROCEDURE — 85027 COMPLETE CBC AUTOMATED: CPT

## 2024-04-29 PROCEDURE — 83036 HEMOGLOBIN GLYCOSYLATED A1C: CPT

## 2024-04-29 PROCEDURE — 99396 PREV VISIT EST AGE 40-64: CPT | Performed by: FAMILY MEDICINE

## 2024-04-29 PROCEDURE — 3079F DIAST BP 80-89 MM HG: CPT | Performed by: FAMILY MEDICINE

## 2024-04-29 SDOH — ECONOMIC STABILITY: FOOD INSECURITY: WITHIN THE PAST 12 MONTHS, YOU WORRIED THAT YOUR FOOD WOULD RUN OUT BEFORE YOU GOT MONEY TO BUY MORE.: NEVER TRUE

## 2024-04-29 SDOH — ECONOMIC STABILITY: FOOD INSECURITY: WITHIN THE PAST 12 MONTHS, THE FOOD YOU BOUGHT JUST DIDN'T LAST AND YOU DIDN'T HAVE MONEY TO GET MORE.: NEVER TRUE

## 2024-04-29 ASSESSMENT — PATIENT HEALTH QUESTIONNAIRE - PHQ9
2. FEELING DOWN, DEPRESSED OR HOPELESS: NOT AT ALL
SUM OF ALL RESPONSES TO PHQ9 QUESTIONS 1 & 2: 0
1. LITTLE INTEREST OR PLEASURE IN DOING THINGS: NOT AT ALL

## 2024-04-29 ASSESSMENT — LIFESTYLE VARIABLES
AUDIT-C TOTAL SCORE: 0
SKIP TO QUESTIONS 9-10: 1
HOW MANY STANDARD DRINKS CONTAINING ALCOHOL DO YOU HAVE ON A TYPICAL DAY: PATIENT DOES NOT DRINK
HOW OFTEN DO YOU HAVE SIX OR MORE DRINKS ON ONE OCCASION: NEVER
HOW OFTEN DO YOU HAVE A DRINK CONTAINING ALCOHOL: NEVER

## 2024-04-29 ASSESSMENT — PAIN SCALES - GENERAL: PAINLEVEL: 0-NO PAIN

## 2024-04-29 NOTE — ASSESSMENT & PLAN NOTE
No longer obese  Lost 54 lbs on Ozempic in 1.5 years until insurance stopped covering it.  Up 11 lbs in past 6 months, she will be starting Rybelsus 14 mg tomorrow (getting from [place] pharmacy due to cost)

## 2024-05-06 ENCOUNTER — TELEPHONE (OUTPATIENT)
Dept: PRIMARY CARE | Facility: CLINIC | Age: 44
End: 2024-05-06

## 2024-05-06 DIAGNOSIS — R73.03 PREDIABETES: Primary | Chronic | ICD-10-CM

## 2024-05-06 NOTE — TELEPHONE ENCOUNTER
Pt called in said she wanted to know if she can switch to  ozempic 2mg, instead of the semaglutide (Rybelsus) 14 mg tablet tablet she said the ozempic is cheaper.     Please advise

## 2024-05-06 NOTE — TELEPHONE ENCOUNTER
That's fine. Please verify pharmacy (she was getting the rybelsus through [place] [facility]). Thanks,  Shania Rincon, DO

## 2024-05-06 NOTE — TELEPHONE ENCOUNTER
Called patient, patient states it is still the [place] pharmacy she used before for the Rybelsus.

## 2024-05-07 NOTE — TELEPHONE ENCOUNTER
Rx printed and will be faxed to [place] pharmacy later today. Can you let her know?   Thanks,  Shania Rincon, DO     Rx faxed to [facility] - [phone]

## 2024-05-08 ENCOUNTER — TELEPHONE (OUTPATIENT)
Dept: PRIMARY CARE | Facility: CLINIC | Age: 44
End: 2024-05-08

## 2024-05-08 DIAGNOSIS — R73.03 PREDIABETES: Primary | Chronic | ICD-10-CM

## 2024-05-08 NOTE — TELEPHONE ENCOUNTER
PT CALLED IN SAID PHARMACY DIDN'T HAVE 8MG OF ozempic ONLY 4MG SO SHE WOULD NEED A NEW PRESCRIPTION FOR 4MG     PHARMACY: [facility] IN [place]  FAX NUMBER [phone]

## 2024-05-09 NOTE — TELEPHONE ENCOUNTER
Called patient and notified of message. Patient expressed verbal understanding and had no questions at this time.

## 2024-05-23 ENCOUNTER — TELEPHONE (OUTPATIENT)
Dept: PRIMARY CARE | Facility: CLINIC | Age: 44
End: 2024-05-23
Payer: COMMERCIAL

## 2024-05-23 NOTE — TELEPHONE ENCOUNTER
Called and left detailed message for patient reminding her that she cannot be on both medications and that the pharmacy may not fill the Ozempic if the Rybelsus is still an active prescription since they are the same type of medication. I asked her to let the pharmacy know which medication she plans on taking and having them discontinue the other med.   Shania Rincon, DO

## 2024-05-23 NOTE — TELEPHONE ENCOUNTER
I spoke with the Pharmacist and let him know that the Rybelsus was discontinued. He told me that the patient specifically asked him to keep the rybelsus active in addition to the ozempic, hence the confusion.

## 2024-05-23 NOTE — TELEPHONE ENCOUNTER
Pharmacist from [facility] in [place] called to say that patient has refills for Ozempic and Rybelsus and they are stating that manufactures do not recommend combining this medication since it is the same medication, per the pharmacist. They wanted to clarify that this is what the prescriber intended for patient to be taking at the same time. Please advise.

## 2024-05-23 NOTE — TELEPHONE ENCOUNTER
Rybelsus has been discontinued. Patient prefers the Ozempic. Can you let [place] pharmacy know - number is in the call intake section. Thanks,  Shania Rincon DO  .

## 2024-07-31 ENCOUNTER — TELEPHONE (OUTPATIENT)
Dept: PRIMARY CARE | Facility: CLINIC | Age: 44
End: 2024-07-31
Payer: COMMERCIAL

## 2024-07-31 DIAGNOSIS — I10 BENIGN ESSENTIAL HYPERTENSION: ICD-10-CM

## 2024-07-31 RX ORDER — LOSARTAN POTASSIUM 50 MG/1
50 TABLET ORAL DAILY
Qty: 90 TABLET | Refills: 3 | Status: SHIPPED | OUTPATIENT
Start: 2024-07-31 | End: 2025-07-31

## 2024-07-31 NOTE — TELEPHONE ENCOUNTER
Patient called in stating that she has had a headache on and off for the last 3 weeks. The last couple days her been constant no matter what she takes over the counter to try and get rid of it.  She also has ringing in her ears for the last 5 days constantly. She took her bp  last night and it was 144/42. She took one of her BP pills last night and 1/2 of her BP pills this morning and now her BP is 129/87. She is wondering if the headaches could be from her BP.     Please Advise    Pharmacy: [facility], [place]

## 2024-07-31 NOTE — TELEPHONE ENCOUNTER
Spoke with patient about Dr. Yoana hayes. She is agreeable to this plan of care and has no further questions.

## 2024-07-31 NOTE — TELEPHONE ENCOUNTER
Yes, the headaches could be from elevated blood pressure. I bumped up her dose of losartan from 25 mg to 50 mg to see if that helps.   Thanks,  Shania Rincon, DO

## 2024-09-23 ENCOUNTER — LAB (OUTPATIENT)
Dept: LAB | Facility: LAB | Age: 44
End: 2024-09-23
Payer: COMMERCIAL

## 2024-09-23 DIAGNOSIS — R79.89 INCREASED AMMONIA LEVEL: ICD-10-CM

## 2024-09-23 DIAGNOSIS — N18.2 STAGE 2 CHRONIC KIDNEY DISEASE: Chronic | ICD-10-CM

## 2024-09-23 LAB
ALBUMIN SERPL BCP-MCNC: 4.2 G/DL (ref 3.4–5)
ALP SERPL-CCNC: 47 U/L (ref 33–110)
ALT SERPL W P-5'-P-CCNC: 20 U/L (ref 7–45)
AMMONIA PLAS-SCNC: 18 UMOL/L (ref 16–53)
ANION GAP SERPL CALC-SCNC: 9 MMOL/L (ref 10–20)
AST SERPL W P-5'-P-CCNC: 16 U/L (ref 9–39)
BILIRUB SERPL-MCNC: 0.6 MG/DL (ref 0–1.2)
BUN SERPL-MCNC: 12 MG/DL (ref 6–23)
CALCIUM SERPL-MCNC: 9.7 MG/DL (ref 8.6–10.3)
CHLORIDE SERPL-SCNC: 107 MMOL/L (ref 98–107)
CO2 SERPL-SCNC: 28 MMOL/L (ref 21–32)
CREAT SERPL-MCNC: 1.27 MG/DL (ref 0.5–1.05)
EGFRCR SERPLBLD CKD-EPI 2021: 53 ML/MIN/1.73M*2
GLUCOSE SERPL-MCNC: 85 MG/DL (ref 74–99)
POTASSIUM SERPL-SCNC: 4.4 MMOL/L (ref 3.5–5.3)
PROT SERPL-MCNC: 6.3 G/DL (ref 6.4–8.2)
SODIUM SERPL-SCNC: 140 MMOL/L (ref 136–145)

## 2024-09-23 PROCEDURE — 36415 COLL VENOUS BLD VENIPUNCTURE: CPT

## 2024-09-23 PROCEDURE — 82140 ASSAY OF AMMONIA: CPT

## 2024-09-23 PROCEDURE — 80053 COMPREHEN METABOLIC PANEL: CPT

## 2024-11-04 ENCOUNTER — APPOINTMENT (OUTPATIENT)
Dept: PRIMARY CARE | Facility: CLINIC | Age: 44
End: 2024-11-04
Payer: COMMERCIAL

## 2024-11-04 VITALS
TEMPERATURE: 96.8 F | WEIGHT: 172 LBS | OXYGEN SATURATION: 99 % | HEIGHT: 64 IN | SYSTOLIC BLOOD PRESSURE: 129 MMHG | BODY MASS INDEX: 29.37 KG/M2 | DIASTOLIC BLOOD PRESSURE: 83 MMHG | RESPIRATION RATE: 20 BRPM | HEART RATE: 78 BPM

## 2024-11-04 DIAGNOSIS — Z12.11 SCREENING FOR COLON CANCER: ICD-10-CM

## 2024-11-04 DIAGNOSIS — K21.9 GASTROESOPHAGEAL REFLUX DISEASE WITHOUT ESOPHAGITIS: Primary | ICD-10-CM

## 2024-11-04 DIAGNOSIS — I10 BENIGN ESSENTIAL HYPERTENSION: ICD-10-CM

## 2024-11-04 DIAGNOSIS — J34.89 NASAL SORE: ICD-10-CM

## 2024-11-04 PROCEDURE — 3074F SYST BP LT 130 MM HG: CPT | Performed by: FAMILY MEDICINE

## 2024-11-04 PROCEDURE — 3008F BODY MASS INDEX DOCD: CPT | Performed by: FAMILY MEDICINE

## 2024-11-04 PROCEDURE — 3079F DIAST BP 80-89 MM HG: CPT | Performed by: FAMILY MEDICINE

## 2024-11-04 PROCEDURE — 1036F TOBACCO NON-USER: CPT | Performed by: FAMILY MEDICINE

## 2024-11-04 PROCEDURE — 99213 OFFICE O/P EST LOW 20 MIN: CPT | Performed by: FAMILY MEDICINE

## 2024-11-04 RX ORDER — LOSARTAN POTASSIUM 50 MG/1
50 TABLET ORAL DAILY
Qty: 90 TABLET | Refills: 3 | Status: SHIPPED | OUTPATIENT
Start: 2024-11-04 | End: 2025-11-04

## 2024-11-04 RX ORDER — FAMOTIDINE 40 MG/1
40 TABLET, FILM COATED ORAL 2 TIMES DAILY
Qty: 180 TABLET | Refills: 3 | Status: SHIPPED | OUTPATIENT
Start: 2024-11-04 | End: 2025-11-04

## 2024-11-04 RX ORDER — MUPIROCIN 20 MG/G
OINTMENT TOPICAL 2 TIMES DAILY
Qty: 15 G | Refills: 0 | Status: SHIPPED | OUTPATIENT
Start: 2024-11-04 | End: 2024-11-14

## 2024-11-04 SDOH — ECONOMIC STABILITY: FOOD INSECURITY: WITHIN THE PAST 12 MONTHS, YOU WORRIED THAT YOUR FOOD WOULD RUN OUT BEFORE YOU GOT MONEY TO BUY MORE.: NEVER TRUE

## 2024-11-04 SDOH — ECONOMIC STABILITY: FOOD INSECURITY: WITHIN THE PAST 12 MONTHS, THE FOOD YOU BOUGHT JUST DIDN'T LAST AND YOU DIDN'T HAVE MONEY TO GET MORE.: NEVER TRUE

## 2024-11-04 ASSESSMENT — PATIENT HEALTH QUESTIONNAIRE - PHQ9
2. FEELING DOWN, DEPRESSED OR HOPELESS: NOT AT ALL
1. LITTLE INTEREST OR PLEASURE IN DOING THINGS: NOT AT ALL
SUM OF ALL RESPONSES TO PHQ9 QUESTIONS 1 & 2: 0

## 2024-11-04 ASSESSMENT — ENCOUNTER SYMPTOMS
DEPRESSION: 0
OCCASIONAL FEELINGS OF UNSTEADINESS: 0
LOSS OF SENSATION IN FEET: 0

## 2024-11-04 ASSESSMENT — LIFESTYLE VARIABLES
HOW OFTEN DO YOU HAVE SIX OR MORE DRINKS ON ONE OCCASION: NEVER
HOW MANY STANDARD DRINKS CONTAINING ALCOHOL DO YOU HAVE ON A TYPICAL DAY: PATIENT DOES NOT DRINK
AUDIT-C TOTAL SCORE: 0
SKIP TO QUESTIONS 9-10: 1
HOW OFTEN DO YOU HAVE A DRINK CONTAINING ALCOHOL: NEVER

## 2024-11-04 ASSESSMENT — PAIN SCALES - GENERAL: PAINLEVEL_OUTOF10: 0-NO PAIN

## 2024-11-04 NOTE — PROGRESS NOTES
"Subjective   Patient ID: Delores Grijalva is a 44 y.o. female who presents for Hypertension and Follow-up (Concerns with a sore in nose, heartburn,and colon screening).  She has a sore inside her nostril which is annoying and has not fully healed for several months.     She would like to discuss colon cancer screening.    She had labs done and is here for review.     She is concerned about indigestion.     She is having right upper eyelid twitching regularly which is very bothersome.         Patient Care Team:  Shania Rincon DO as PCP - General  Shania Rincon DO as PCP - [facility] PCP  Mehrdad Roper MD as Consulting Physician (Rheumatology)  Pacheco Valadez MD as Consulting Physician (Nephrology)    Current Outpatient Medications   Medication Sig Dispense Refill    magnesium oxide 500 mg tablet Take by mouth.      semaglutide (OZEMPIC) 1 mg/dose (4 mg/3 mL) pen injector Inject 1 mg under the skin 1 (one) time per week. 6 mL 8    famotidine (Pepcid) 40 mg tablet Take 1 tablet (40 mg) by mouth 2 times a day. 180 tablet 3    losartan (Cozaar) 50 mg tablet Take 1 tablet (50 mg) by mouth once daily. 90 tablet 3    mupirocin (Bactroban) 2 % ointment Apply topically 2 times a day for 10 days. 15 g 0     No current facility-administered medications for this visit.       Objective     Visit Vitals  /83 (BP Location: Left arm, Patient Position: Sitting, BP Cuff Size: Adult)   Pulse 78   Temp 36 °C (96.8 °F) (Temporal)   Resp 20   Ht 1.626 m (5' 4\")   Wt 78 kg (172 lb)   SpO2 99%   BMI 29.52 kg/m²   OB Status Hysterectomy   Smoking Status Never   BSA 1.88 m²        Constitutional: Well nourished, well developed, appears stated age  Eyes: no scleral icterus, no conjunctival injection  Respiratory: normal respiratory effort  Musculoskeletal: No gross deformities appreciated  Skin: Warm, dry.  Neurologic: Alert, CNs II-XII grossly intact..  Psych: Appropriate mood and affect.        Assessment & Plan  Benign " essential hypertension  Controlled, continue losartan  Orders:    losartan (Cozaar) 50 mg tablet; Take 1 tablet (50 mg) by mouth once daily.    Gastroesophageal reflux disease without esophagitis  PPI contraindicated due to CKD  She is taking famotidine once a day, will increase to BID  Orders:    famotidine (Pepcid) 40 mg tablet; Take 1 tablet (40 mg) by mouth 2 times a day.    Nasal sore    Orders:    mupirocin (Bactroban) 2 % ointment; Apply topically 2 times a day for 10 days.    Screening for colon cancer    Orders:    Colonoscopy Screening; Average Risk Patient; Future           All pertinent lab work and results were reviewed with patient.     Follow up 6 months.     Shania Rincon DO

## 2024-11-04 NOTE — ASSESSMENT & PLAN NOTE
PPI contraindicated due to CKD  She is taking famotidine once a day, will increase to BID  Orders:    famotidine (Pepcid) 40 mg tablet; Take 1 tablet (40 mg) by mouth 2 times a day.

## 2024-11-04 NOTE — ASSESSMENT & PLAN NOTE
Controlled, continue losartan  Orders:    losartan (Cozaar) 50 mg tablet; Take 1 tablet (50 mg) by mouth once daily.

## 2024-11-04 NOTE — PATIENT INSTRUCTIONS
Referral for Colonoscopy  We are going to fax your order for screening colonoscopy to [facility]. They will contact you to schedule an appointment.  If you do not hear from [facility] in 2-3 business days, please call [phone] for assistance with scheduling your referral.    Thank you for choosing [facility] for your healthcare.   As always if you have any questions or concerns please do not hesitate to call our office at [phone] or through [employer].    Have a great day!  Shania Rincon, DO

## 2024-11-14 ENCOUNTER — TELEPHONE (OUTPATIENT)
Dept: GASTROENTEROLOGY | Facility: CLINIC | Age: 44
End: 2024-11-14
Payer: COMMERCIAL

## 2024-11-14 NOTE — TELEPHONE ENCOUNTER
----- Message from Nurse Christina MAKI sent at 11/14/2024 10:43 AM EST -----  Regarding: [facility]  OA

## 2024-12-04 NOTE — TELEPHONE ENCOUNTER
Pt scheduled 1/20/25 with Dr. Hinson - packet/miralax prep sent via [employer] 12/4/24 - pt aware to hold ozempic 1 week prior.

## 2025-01-20 ENCOUNTER — HOSPITAL ENCOUNTER (OUTPATIENT)
Dept: GASTROENTEROLOGY | Facility: HOSPITAL | Age: 45
Discharge: HOME | End: 2025-01-20
Payer: COMMERCIAL

## 2025-01-20 ENCOUNTER — ANESTHESIA (OUTPATIENT)
Dept: GASTROENTEROLOGY | Facility: HOSPITAL | Age: 45
End: 2025-01-20
Payer: COMMERCIAL

## 2025-01-20 ENCOUNTER — ANESTHESIA EVENT (OUTPATIENT)
Dept: GASTROENTEROLOGY | Facility: HOSPITAL | Age: 45
End: 2025-01-20
Payer: COMMERCIAL

## 2025-01-20 VITALS
RESPIRATION RATE: 21 BRPM | WEIGHT: 172 LBS | BODY MASS INDEX: 29.37 KG/M2 | SYSTOLIC BLOOD PRESSURE: 112 MMHG | DIASTOLIC BLOOD PRESSURE: 67 MMHG | HEART RATE: 84 BPM | OXYGEN SATURATION: 99 % | TEMPERATURE: 97.4 F | HEIGHT: 64 IN

## 2025-01-20 DIAGNOSIS — Z12.11 SCREENING FOR COLON CANCER: ICD-10-CM

## 2025-01-20 PROCEDURE — 7100000010 HC PHASE TWO TIME - EACH INCREMENTAL 1 MINUTE

## 2025-01-20 PROCEDURE — 2500000004 HC RX 250 GENERAL PHARMACY W/ HCPCS (ALT 636 FOR OP/ED): Performed by: NURSE ANESTHETIST, CERTIFIED REGISTERED

## 2025-01-20 PROCEDURE — 45380 COLONOSCOPY AND BIOPSY: CPT | Performed by: INTERNAL MEDICINE

## 2025-01-20 PROCEDURE — 3700000001 HC GENERAL ANESTHESIA TIME - INITIAL BASE CHARGE

## 2025-01-20 PROCEDURE — 3700000002 HC GENERAL ANESTHESIA TIME - EACH INCREMENTAL 1 MINUTE

## 2025-01-20 PROCEDURE — 88305 TISSUE EXAM BY PATHOLOGIST: CPT | Mod: TC,PORLAB | Performed by: INTERNAL MEDICINE

## 2025-01-20 PROCEDURE — 7100000009 HC PHASE TWO TIME - INITIAL BASE CHARGE

## 2025-01-20 RX ORDER — PROPOFOL 10 MG/ML
INJECTION, EMULSION INTRAVENOUS AS NEEDED
Status: DISCONTINUED | OUTPATIENT
Start: 2025-01-20 | End: 2025-01-20

## 2025-01-20 RX ORDER — SODIUM CHLORIDE 0.9 % (FLUSH) 0.9 %
SYRINGE (ML) INJECTION AS NEEDED
Status: DISCONTINUED | OUTPATIENT
Start: 2025-01-20 | End: 2025-01-20

## 2025-01-20 RX ORDER — LIDOCAINE HYDROCHLORIDE 20 MG/ML
INJECTION, SOLUTION EPIDURAL; INFILTRATION; INTRACAUDAL; PERINEURAL AS NEEDED
Status: DISCONTINUED | OUTPATIENT
Start: 2025-01-20 | End: 2025-01-20

## 2025-01-20 RX ADMIN — PROPOFOL 50 MG: 10 INJECTION, EMULSION INTRAVENOUS at 07:33

## 2025-01-20 RX ADMIN — Medication 2 ML: at 07:38

## 2025-01-20 RX ADMIN — PROPOFOL 50 MG: 10 INJECTION, EMULSION INTRAVENOUS at 07:38

## 2025-01-20 RX ADMIN — Medication 2 ML: at 07:33

## 2025-01-20 RX ADMIN — Medication 2 ML: at 07:41

## 2025-01-20 RX ADMIN — PROPOFOL 50 MG: 10 INJECTION, EMULSION INTRAVENOUS at 07:35

## 2025-01-20 RX ADMIN — Medication 2 ML: at 07:32

## 2025-01-20 RX ADMIN — PROPOFOL 50 MG: 10 INJECTION, EMULSION INTRAVENOUS at 07:41

## 2025-01-20 RX ADMIN — LIDOCAINE HYDROCHLORIDE 40 MG: 20 INJECTION, SOLUTION EPIDURAL; INFILTRATION; INTRACAUDAL; PERINEURAL at 07:32

## 2025-01-20 RX ADMIN — PROPOFOL 100 MG: 10 INJECTION, EMULSION INTRAVENOUS at 07:32

## 2025-01-20 RX ADMIN — Medication 2 ML: at 07:35

## 2025-01-20 SDOH — HEALTH STABILITY: MENTAL HEALTH: CURRENT SMOKER: 0

## 2025-01-20 ASSESSMENT — PAIN - FUNCTIONAL ASSESSMENT
PAIN_FUNCTIONAL_ASSESSMENT: 0-10

## 2025-01-20 ASSESSMENT — PAIN SCALES - GENERAL
PAIN_LEVEL: 0
PAINLEVEL_OUTOF10: 0 - NO PAIN

## 2025-01-20 ASSESSMENT — COLUMBIA-SUICIDE SEVERITY RATING SCALE - C-SSRS
6. HAVE YOU EVER DONE ANYTHING, STARTED TO DO ANYTHING, OR PREPARED TO DO ANYTHING TO END YOUR LIFE?: NO
1. IN THE PAST MONTH, HAVE YOU WISHED YOU WERE DEAD OR WISHED YOU COULD GO TO SLEEP AND NOT WAKE UP?: NO
2. HAVE YOU ACTUALLY HAD ANY THOUGHTS OF KILLING YOURSELF?: NO

## 2025-01-20 NOTE — ANESTHESIA PREPROCEDURE EVALUATION
Patient: Delores Grijalva    Procedure Information       Date/Time: 01/20/25 0730    Scheduled providers: Shai Hinson DO    Procedure: COLONOSCOPY    Location: [facility]            Relevant Problems   Anesthesia (within normal limits)      Cardiac   (+) Benign essential hypertension      Neuro   (+) Generalized anxiety disorder      GI   (+) GERD (gastroesophageal reflux disease)   (+) Irritable bowel syndrome      Musculoskeletal   (+) Chronic low back pain       Clinical information reviewed:   Tobacco  Allergies  Meds   Med Hx  Surg Hx  OB Status  Fam Hx  Soc   Hx        NPO Detail:  NPO/Void Status  Date of Last Liquid: 01/20/25  Time of Last Liquid: 0300  Date of Last Solid: 01/18/25  Last Intake Type: Clear fluids         Physical Exam    Airway  Mallampati: III  TM distance: >3 FB  Neck ROM: full     Cardiovascular - normal exam  Rhythm: regular  Rate: normal     Dental - normal exam     Pulmonary - normal exam     Abdominal - normal exam             Anesthesia Plan    History of general anesthesia?: yes  History of complications of general anesthesia?: no    ASA 2     MAC     The patient is not a current smoker.    intravenous induction   Anesthetic plan and risks discussed with patient.

## 2025-01-20 NOTE — H&P
History Of Present Illness  Delores Grijalva is a 44 y.o. female presenting for screening colon.     Past Medical History  Past Medical History:   Diagnosis Date    Acute upper respiratory infection, unspecified 02/03/2020    Acute URI    Body mass index (BMI) 31.0-31.9, adult 12/11/2019    Body mass index (BMI) of 31.0 to 31.9 in adult    Body mass index (BMI) 32.0-32.9, adult 03/26/2021    Body mass index (BMI) of 32.0 to 32.9 in adult    Major depressive disorder, single episode, mild ([facility]) 03/26/2021    Depression, major, single episode, mild    Other abnormal and inconclusive findings on diagnostic imaging of breast 04/15/2021    Abnormal screening mammogram    Other hypersomnia 03/26/2021    Excessive daytime sleepiness    Personal history of other specified conditions 09/25/2019    History of fatigue    Personal history of other specified conditions 09/25/2019    History of pelvic mass       Surgical History  Past Surgical History:   Procedure Laterality Date    OTHER SURGICAL HISTORY  09/25/2019    Hysterectomy    OTHER SURGICAL HISTORY  02/09/2022    Kidney biopsy        Social History  She reports that she has never smoked. She has never been exposed to tobacco smoke. She has never used smokeless tobacco. She reports that she does not drink alcohol and does not use drugs.    Family History  Family History   Problem Relation Name Age of Onset    No Known Problems Mother      No Known Problems Father          Allergies  Patient has no known allergies.    Review of Systems     Physical Exam  Vitals reviewed.   Constitutional:       General: She is awake.      Appearance: Normal appearance.   HENT:      Head: Normocephalic.      Mouth/Throat:      Mouth: Mucous membranes are moist.   Eyes:      Extraocular Movements: Extraocular movements intact.   Cardiovascular:      Rate and Rhythm: Normal rate.      Heart sounds: Normal heart sounds.   Pulmonary:      Effort: Pulmonary effort is normal.      Breath  "sounds: Normal breath sounds.   Abdominal:      General: Bowel sounds are normal.      Palpations: Abdomen is soft.      Tenderness: There is no abdominal tenderness. There is no guarding or rebound.      Hernia: No hernia is present.   Musculoskeletal:         General: Normal range of motion.      Cervical back: Neck supple.   Skin:     General: Skin is warm and dry.   Neurological:      General: No focal deficit present.      Mental Status: She is alert.   Psychiatric:         Attention and Perception: Attention and perception normal.         Mood and Affect: Mood normal.         Behavior: Behavior normal.          Last Recorded Vitals  Blood pressure 110/73, pulse 80, temperature 37.3 °C (99.1 °F), temperature source Temporal, resp. rate 16, height 1.626 m (5' 4\"), weight 78 kg (172 lb), SpO2 99%.    Relevant Results        Current Outpatient Medications   Medication Instructions    famotidine (PEPCID) 40 mg, oral, 2 times daily    losartan (COZAAR) 50 mg, oral, Daily    magnesium oxide 500 mg tablet Take by mouth.    semaglutide (OZEMPIC) 1 mg, subcutaneous, Once Weekly          Assessment/Plan   Assessment & Plan  Screening for colon cancer        Average Risk Colorectal Cancer   - screening colonoscopy for evaluation     Risk and benefits of the endoscopic procedure including bleeding perforation and infection were discussed with patient and they wish to proceed                 Shai Hinson DO    "

## 2025-01-20 NOTE — ANESTHESIA POSTPROCEDURE EVALUATION
Patient: Delores Grijalva    Procedure Summary       Date: 01/20/25 Room / Location: [facility]    Anesthesia Start: 0728 Anesthesia Stop: 0750    Procedure: COLONOSCOPY Diagnosis: Screening for colon cancer    Scheduled Providers: Shai Hinson DO Responsible Provider: CORTEZ Arauz    Anesthesia Type: MAC ASA Status: 2            Anesthesia Type: MAC    Vitals Value Taken Time   /67 01/20/25 0809   Temp 36.3 °C (97.4 °F) 01/20/25 0809   Pulse 84 01/20/25 0809   Resp 21 01/20/25 0809   SpO2 99 % 01/20/25 0809       Anesthesia Post Evaluation    Patient location during evaluation: bedside  Patient participation: complete - patient participated  Level of consciousness: awake and alert  Pain score: 0  Pain management: adequate  Airway patency: patent  Cardiovascular status: acceptable and hemodynamically stable  Respiratory status: acceptable  Hydration status: acceptable  Postoperative Nausea and Vomiting: none        There were no known notable events for this encounter.

## 2025-01-23 LAB
LABORATORY COMMENT REPORT: NORMAL
PATH REPORT.FINAL DX SPEC: NORMAL
PATH REPORT.GROSS SPEC: NORMAL
PATH REPORT.RELEVANT HX SPEC: NORMAL
PATH REPORT.TOTAL CANCER: NORMAL

## 2025-02-11 ENCOUNTER — TELEPHONE (OUTPATIENT)
Dept: PRIMARY CARE | Facility: CLINIC | Age: 45
End: 2025-02-11
Payer: COMMERCIAL

## 2025-02-11 DIAGNOSIS — M25.519 ACUTE SHOULDER PAIN, UNSPECIFIED LATERALITY: Primary | ICD-10-CM

## 2025-02-11 RX ORDER — DICLOFENAC SODIUM 10 MG/G
4 GEL TOPICAL 4 TIMES DAILY
Qty: 100 G | Refills: 1 | Status: SHIPPED | OUTPATIENT
Start: 2025-02-11

## 2025-02-11 NOTE — TELEPHONE ENCOUNTER
Patient called in to report that she is continuously having arm pain discomfort and is requesting a non steroid cream to be called into her pharmacy or whatever Dr. Rincon may recommend.

## 2025-04-09 ENCOUNTER — HOSPITAL ENCOUNTER (OUTPATIENT)
Dept: RADIOLOGY | Facility: CLINIC | Age: 45
Discharge: HOME | End: 2025-04-09
Payer: COMMERCIAL

## 2025-04-09 ENCOUNTER — TELEPHONE (OUTPATIENT)
Dept: PRIMARY CARE | Facility: CLINIC | Age: 45
End: 2025-04-09
Payer: COMMERCIAL

## 2025-04-09 DIAGNOSIS — R09.89 CHEST CONGESTION: ICD-10-CM

## 2025-04-09 DIAGNOSIS — R09.89 CHEST CONGESTION: Primary | ICD-10-CM

## 2025-04-09 PROCEDURE — 71046 X-RAY EXAM CHEST 2 VIEWS: CPT

## 2025-04-09 PROCEDURE — 71046 X-RAY EXAM CHEST 2 VIEWS: CPT | Performed by: RADIOLOGY

## 2025-04-09 NOTE — TELEPHONE ENCOUNTER
Patient called having heaviness in her chest she is worried about having pneumonia and would like a chest xray ordered. She understands you are out of the office today. She declines the UC or ER stating she knows it is not heart related. She is just getting over as cold that she can't seem to shake.

## 2025-05-16 NOTE — PATIENT INSTRUCTIONS
Thank you for choosing [facility] for your healthcare.   As always if you have any questions or concerns please do not hesitate to call our office at [phone] or through [employer].    Have a great day!  Shania Rincon, DO

## 2025-05-19 ENCOUNTER — APPOINTMENT (OUTPATIENT)
Dept: PRIMARY CARE | Facility: CLINIC | Age: 45
End: 2025-05-19
Payer: COMMERCIAL

## 2025-05-19 VITALS
RESPIRATION RATE: 20 BRPM | DIASTOLIC BLOOD PRESSURE: 78 MMHG | TEMPERATURE: 96.5 F | WEIGHT: 170.4 LBS | SYSTOLIC BLOOD PRESSURE: 112 MMHG | BODY MASS INDEX: 29.09 KG/M2 | HEART RATE: 82 BPM | OXYGEN SATURATION: 98 % | HEIGHT: 64 IN

## 2025-05-19 DIAGNOSIS — I10 BENIGN ESSENTIAL HYPERTENSION: Primary | ICD-10-CM

## 2025-05-19 DIAGNOSIS — Z12.31 OTHER SCREENING MAMMOGRAM: ICD-10-CM

## 2025-05-19 DIAGNOSIS — M25.511 CHRONIC RIGHT SHOULDER PAIN: ICD-10-CM

## 2025-05-19 DIAGNOSIS — B00.1 RECURRENT HERPES LABIALIS: ICD-10-CM

## 2025-05-19 DIAGNOSIS — G89.29 CHRONIC RIGHT SHOULDER PAIN: ICD-10-CM

## 2025-05-19 DIAGNOSIS — G47.33 OSA (OBSTRUCTIVE SLEEP APNEA): Chronic | ICD-10-CM

## 2025-05-19 DIAGNOSIS — R07.89 CHEST WALL PAIN: ICD-10-CM

## 2025-05-19 PROCEDURE — 3078F DIAST BP <80 MM HG: CPT | Performed by: FAMILY MEDICINE

## 2025-05-19 PROCEDURE — 3008F BODY MASS INDEX DOCD: CPT | Performed by: FAMILY MEDICINE

## 2025-05-19 PROCEDURE — 1036F TOBACCO NON-USER: CPT | Performed by: FAMILY MEDICINE

## 2025-05-19 PROCEDURE — 99213 OFFICE O/P EST LOW 20 MIN: CPT | Performed by: FAMILY MEDICINE

## 2025-05-19 PROCEDURE — 3074F SYST BP LT 130 MM HG: CPT | Performed by: FAMILY MEDICINE

## 2025-05-19 RX ORDER — VALACYCLOVIR HYDROCHLORIDE 1 G/1
TABLET, FILM COATED ORAL
Qty: 4 TABLET | Refills: 5 | Status: SHIPPED | OUTPATIENT
Start: 2025-05-19

## 2025-05-19 RX ORDER — LOSARTAN POTASSIUM 50 MG/1
50 TABLET ORAL DAILY
Qty: 90 TABLET | Refills: 3 | Status: SHIPPED | OUTPATIENT
Start: 2025-05-19 | End: 2026-05-19

## 2025-05-19 SDOH — ECONOMIC STABILITY: FOOD INSECURITY: WITHIN THE PAST 12 MONTHS, THE FOOD YOU BOUGHT JUST DIDN'T LAST AND YOU DIDN'T HAVE MONEY TO GET MORE.: NEVER TRUE

## 2025-05-19 SDOH — ECONOMIC STABILITY: FOOD INSECURITY: WITHIN THE PAST 12 MONTHS, YOU WORRIED THAT YOUR FOOD WOULD RUN OUT BEFORE YOU GOT MONEY TO BUY MORE.: NEVER TRUE

## 2025-05-19 ASSESSMENT — ANXIETY QUESTIONNAIRES
5. BEING SO RESTLESS THAT IT IS HARD TO SIT STILL: NOT AT ALL
4. TROUBLE RELAXING: NOT AT ALL
IF YOU CHECKED OFF ANY PROBLEMS ON THIS QUESTIONNAIRE, HOW DIFFICULT HAVE THESE PROBLEMS MADE IT FOR YOU TO DO YOUR WORK, TAKE CARE OF THINGS AT HOME, OR GET ALONG WITH OTHER PEOPLE: NOT DIFFICULT AT ALL
1. FEELING NERVOUS, ANXIOUS, OR ON EDGE: NOT AT ALL
2. NOT BEING ABLE TO STOP OR CONTROL WORRYING: NOT AT ALL
7. FEELING AFRAID AS IF SOMETHING AWFUL MIGHT HAPPEN: NOT AT ALL
6. BECOMING EASILY ANNOYED OR IRRITABLE: NOT AT ALL
GAD7 TOTAL SCORE: 0
3. WORRYING TOO MUCH ABOUT DIFFERENT THINGS: NOT AT ALL

## 2025-05-19 ASSESSMENT — LIFESTYLE VARIABLES
HOW MANY STANDARD DRINKS CONTAINING ALCOHOL DO YOU HAVE ON A TYPICAL DAY: PATIENT DOES NOT DRINK
HOW OFTEN DO YOU HAVE A DRINK CONTAINING ALCOHOL: NEVER
HOW OFTEN DO YOU HAVE SIX OR MORE DRINKS ON ONE OCCASION: NEVER
SKIP TO QUESTIONS 9-10: 1
AUDIT-C TOTAL SCORE: 0

## 2025-05-19 ASSESSMENT — ENCOUNTER SYMPTOMS
OCCASIONAL FEELINGS OF UNSTEADINESS: 0
DEPRESSION: 0
LOSS OF SENSATION IN FEET: 0

## 2025-05-19 ASSESSMENT — PAIN SCALES - GENERAL: PAINLEVEL_OUTOF10: 0-NO PAIN

## 2025-05-19 ASSESSMENT — PATIENT HEALTH QUESTIONNAIRE - PHQ9
1. LITTLE INTEREST OR PLEASURE IN DOING THINGS: NOT AT ALL
SUM OF ALL RESPONSES TO PHQ9 QUESTIONS 1 & 2: 0
2. FEELING DOWN, DEPRESSED OR HOPELESS: NOT AT ALL

## 2025-05-19 NOTE — ASSESSMENT & PLAN NOTE
He received an injection from rheumatology in February.  She thinks it may have helped for a week or 2 in terms of decreasing the pain in the shoulder.    Orders:    Referral to Orthopedics and Sports Medicine; Future

## 2025-05-19 NOTE — ASSESSMENT & PLAN NOTE
Does not feel rested despite over 8 hours of sleep  Not on CPAP  Sleeps on her back, I recommended she switch to sleeping on her side.

## 2025-05-19 NOTE — PROGRESS NOTES
"Subjective   Patient ID: Delores Grijalva is a 45 y.o. female who presents for Follow-up (6 month follow up /Would like to get a mammogram), medication (Dr Roper started her on Topimate and was having side effects (chest pain) stopped taking (04/12/25) and side effects stopped.  ), and Hypertension.  She continues to experience right shoulder pain, decreased range of motion, and weakness.  She denies any injury.  This has been an ongoing problem for over 6 months.  She has trouble lifting anything with her right arm/shoulder. She is left handed.    She is concerned about recurrent cold sore between her upper lip and nose.    She experiences anterior chest wall discomfort with deep breath.        Patient Care Team:  Shania Rincon DO as PCP - General  Shania Rincon DO as PCP - MMO ACO PCP  Mehrdad Roper MD as Consulting Physician (Rheumatology)  Pacheco Valadez MD as Consulting Physician (Nephrology)    Current Medications[1]    Objective     Visit Vitals  /78 (BP Location: Right arm, Patient Position: Sitting, BP Cuff Size: Adult)   Pulse 82   Temp 35.8 °C (96.5 °F) (Temporal)   Resp 20   Ht 1.626 m (5' 4\")   Wt 77.3 kg (170 lb 6.4 oz)   SpO2 98%   BMI 29.25 kg/m²   OB Status Hysterectomy   Smoking Status Never   BSA 1.87 m²        Constitutional: Well nourished, well developed, appears stated age  Eyes: no scleral icterus, no conjunctival injection  Respiratory: normal respiratory effort  Musculoskeletal: No gross deformities appreciated  Skin: Warm, dry. Herpes labialis   Neurologic: Alert, CNs II-XII grossly intact..  Psych: Appropriate mood and affect.        Assessment & Plan  Benign essential hypertension  Controlled, continue losartan  Orders:    losartan (Cozaar) 50 mg tablet; Take 1 tablet (50 mg) by mouth once daily.    Other screening mammogram    Orders:    BI mammo bilateral screening; Future    Recurrent herpes labialis    Orders:    valACYclovir (Valtrex) 1 gram tablet; Take 2 tabs " (2000 mg) 2 times a days for 1 day.    Chronic right shoulder pain  He received an injection from rheumatology in February.  She thinks it may have helped for a week or 2 in terms of decreasing the pain in the shoulder.    Orders:    Referral to Orthopedics and Sports Medicine; Future    ARON (obstructive sleep apnea)  Does not feel rested despite over 8 hours of sleep  Not on CPAP  Sleeps on her back, I recommended she switch to sleeping on her side.          Chest wall pain  Muscular  Patient will try yoga          Follow up yearly and prn.    Shania Rincon DO         [1]   Current Outpatient Medications   Medication Sig Dispense Refill    famotidine (Pepcid) 40 mg tablet Take 1 tablet (40 mg) by mouth 2 times a day. 180 tablet 3    magnesium oxide 500 mg tablet Take by mouth.      semaglutide (OZEMPIC) 1 mg/dose (4 mg/3 mL) pen injector Inject 1 mg under the skin 1 (one) time per week. 6 mL 8    losartan (Cozaar) 50 mg tablet Take 1 tablet (50 mg) by mouth once daily. 90 tablet 3    valACYclovir (Valtrex) 1 gram tablet Take 2 tabs (2000 mg) 2 times a days for 1 day. 4 tablet 5     No current facility-administered medications for this visit.

## 2025-05-28 ENCOUNTER — TELEPHONE (OUTPATIENT)
Dept: PRIMARY CARE | Facility: CLINIC | Age: 45
End: 2025-05-28
Payer: COMMERCIAL

## 2025-05-28 NOTE — TELEPHONE ENCOUNTER
Patient called in reporting 4 days of a stomach ache. She thinks its getting worse    She states she has Black stool that is Tarry in consistency.     Pharmacy: [facility], [place]

## 2025-05-28 NOTE — TELEPHONE ENCOUNTER
I recommend she go to ER for evaluation of abdominal pain with black tarry stool. It sounds like she could be having a GI bleed. Thanks,  Shania Rincon, DO

## 2025-06-10 ENCOUNTER — TELEPHONE (OUTPATIENT)
Dept: PRIMARY CARE | Facility: CLINIC | Age: 45
End: 2025-06-10
Payer: COMMERCIAL

## 2025-06-10 DIAGNOSIS — R73.03 PREDIABETES: Chronic | ICD-10-CM

## 2025-06-10 NOTE — TELEPHONE ENCOUNTER
Pt called in requesting a hard copy of her semaglutide (OZEMPIC) 1 mg/dose (4 mg/3 mL) pen injector prescription.    Please advise

## 2025-06-11 ENCOUNTER — HOSPITAL ENCOUNTER (OUTPATIENT)
Dept: RADIOLOGY | Facility: CLINIC | Age: 45
Discharge: HOME | End: 2025-06-11
Payer: COMMERCIAL

## 2025-06-11 VITALS — WEIGHT: 170 LBS | HEIGHT: 64 IN | BODY MASS INDEX: 29.02 KG/M2

## 2025-06-11 DIAGNOSIS — Z12.31 OTHER SCREENING MAMMOGRAM: ICD-10-CM

## 2025-06-11 PROCEDURE — 77067 SCR MAMMO BI INCL CAD: CPT

## 2025-06-11 PROCEDURE — 77063 BREAST TOMOSYNTHESIS BI: CPT | Performed by: RADIOLOGY

## 2025-06-11 PROCEDURE — 77067 SCR MAMMO BI INCL CAD: CPT | Performed by: RADIOLOGY

## 2025-06-30 ENCOUNTER — HOSPITAL ENCOUNTER (OUTPATIENT)
Dept: RADIOLOGY | Facility: CLINIC | Age: 45
Discharge: HOME | End: 2025-06-30
Payer: COMMERCIAL

## 2025-06-30 ENCOUNTER — APPOINTMENT (OUTPATIENT)
Dept: ORTHOPEDIC SURGERY | Facility: CLINIC | Age: 45
End: 2025-06-30
Payer: COMMERCIAL

## 2025-06-30 VITALS — BODY MASS INDEX: 29.02 KG/M2 | WEIGHT: 170 LBS | HEIGHT: 64 IN

## 2025-06-30 DIAGNOSIS — M25.511 RIGHT SHOULDER PAIN, UNSPECIFIED CHRONICITY: ICD-10-CM

## 2025-06-30 DIAGNOSIS — M25.811 SHOULDER IMPINGEMENT, RIGHT: Primary | ICD-10-CM

## 2025-06-30 DIAGNOSIS — G89.29 CHRONIC RIGHT SHOULDER PAIN: ICD-10-CM

## 2025-06-30 DIAGNOSIS — M25.511 CHRONIC RIGHT SHOULDER PAIN: ICD-10-CM

## 2025-06-30 PROCEDURE — 73030 X-RAY EXAM OF SHOULDER: CPT | Mod: RIGHT SIDE | Performed by: RADIOLOGY

## 2025-06-30 PROCEDURE — 73030 X-RAY EXAM OF SHOULDER: CPT | Mod: RT

## 2025-06-30 PROCEDURE — 99204 OFFICE O/P NEW MOD 45 MIN: CPT | Performed by: STUDENT IN AN ORGANIZED HEALTH CARE EDUCATION/TRAINING PROGRAM

## 2025-06-30 PROCEDURE — 3008F BODY MASS INDEX DOCD: CPT | Performed by: STUDENT IN AN ORGANIZED HEALTH CARE EDUCATION/TRAINING PROGRAM

## 2025-06-30 PROCEDURE — 20610 DRAIN/INJ JOINT/BURSA W/O US: CPT | Performed by: STUDENT IN AN ORGANIZED HEALTH CARE EDUCATION/TRAINING PROGRAM

## 2025-06-30 PROCEDURE — 1036F TOBACCO NON-USER: CPT | Performed by: STUDENT IN AN ORGANIZED HEALTH CARE EDUCATION/TRAINING PROGRAM

## 2025-06-30 RX ADMIN — LIDOCAINE HYDROCHLORIDE 2 ML: 10 INJECTION, SOLUTION INFILTRATION; PERINEURAL at 10:15

## 2025-06-30 RX ADMIN — TRIAMCINOLONE ACETONIDE 40 MG: 40 INJECTION, SUSPENSION INTRA-ARTICULAR; INTRAMUSCULAR at 10:15

## 2025-06-30 ASSESSMENT — PAIN - FUNCTIONAL ASSESSMENT: PAIN_FUNCTIONAL_ASSESSMENT: 0-10

## 2025-06-30 ASSESSMENT — PAIN SCALES - GENERAL: PAINLEVEL_OUTOF10: 3

## 2025-06-30 NOTE — PROGRESS NOTES
PRIMARY CARE PHYSICIAN: Shania Rincon DO  REFERRING PROVIDER: Shania Rincon DO  [address]  [facility], [address]  [place] [id number]     CONSULT ORTHOPAEDIC: Shoulder Evaluation    ASSESSMENT & PLAN    Impression: 45 y.o. female with right shoulder impingement and rotator cuff tendinosis.    Plan:   I explained to the patient the nature of their diagnosis.  I reviewed their imaging studies with them.    Based on the history, physical exam and imaging studies above, the patient's presentation is consistent with the above diagnosis.  I had a long discussion with the patient regarding their presentation and the treatment options.  We discussed initial nonoperative versus operative management options as well as potential further diagnostic imaging.  I reviewed the patient's x-ray findings with her.  We discussed her treatment options going forward.  We will begin with some nonoperative management.  I provided her with a corticosteroid injection into the subacromial space which she tolerated well.  She will avoid aggravating motions and positions.  She will work on her rotator cuff strength and scapular stabilization through the home exercise program provided today.  If she does not improve sufficiently with the above then we will consider an MRI of the right shoulder to rule out soft tissue injury.    Follow-Up: Patient will follow-up as needed    At the end of the visit, all questions were answered in full. The patient is in agreement with the plan and recommendations. They will call the office with any questions/concerns.    Note dictated with Satisp[employer] software. Completed without full typed error editing and sent to avoid delay.       SUBJECTIVE  CHIEF COMPLAINT:   Chief Complaint   Patient presents with    Right Shoulder - Pain        HPI: Delores Grijalva is a 45 y.o. female who presents today with right shoulder problems cine 11/2024. They do not remember a specific  instance which would have caused their pain. Pain is lateral and goes up into their trap and down their arm. Reports crepitus and some ROM issues. Worse with putting weight onto the shoulder.  She is having difficulty with arm extended and overhead activities.  Hx of a right shoulder CSI on 2/18/2025 which provided some relief for about 1 month No Hx of trauma or Sx to the area. No diabetes. XR done today. No formal PT    They deny any associated neck pain.  No numbness, tingling, or paresthesias.    REVIEW OF SYSTEMS  Constitutional: See HPI for pain assessment, No significant weight loss, recent trauma  Cardiovascular: No chest pain, shortness of breath  Respiratory: No difficulty breathing, cough  Gastrointestinal: No nausea, vomiting, diarrhea, constipation  Musculoskeletal: Noted in HPI, positive for pain, restricted motion, stiffness  Integumentary: No rashes, easy bruising, redness   Neurological: no numbness or tingling in extremities, no gait disturbances   Psychiatric: No mood changes, memory changes, social issues  Heme/Lymph: no excessive swelling, easy bruising, excessive bleeding  ENT: No hearing changes  Eyes: No vision changes    Medical History[1]     Allergies[2]     Surgical History[3]     Family History[4]     Social History     Socioeconomic History    Marital status:      Spouse name: Not on file    Number of children: Not on file    Years of education: Not on file    Highest education level: Not on file   Occupational History    Not on file   Tobacco Use    Smoking status: Never     Passive exposure: Never    Smokeless tobacco: Never   Vaping Use    Vaping status: Never Used   Substance and Sexual Activity    Alcohol use: Never    Drug use: Never    Sexual activity: Yes     Partners: Male     Birth control/protection: Surgical   Other Topics Concern    Not on file   Social History Narrative    Not on file     Social Drivers of Health     Financial Resource Strain: Not on file   Food  "Insecurity: No Food Insecurity (5/19/2025)    Hunger Vital Sign     Worried About Running Out of Food in the Last Year: Never true     Ran Out of Food in the Last Year: Never true   Transportation Needs: Not on file   Physical Activity: Not on file   Stress: Not on file   Social Connections: Not on file   Intimate Partner Violence: Not on file   Housing Stability: Not on file        CURRENT MEDICATIONS:   Current Medications[5]     OBJECTIVE    PHYSICAL EXAM      1/20/2025     7:03 AM 1/20/2025     7:49 AM 1/20/2025     7:59 AM 1/20/2025     8:09 AM 5/19/2025     9:41 AM 6/11/2025    11:13 AM 6/30/2025    10:16 AM   Vitals   Systolic 110 100 118 112 112     Diastolic 73 58 85 67 78     BP Location     Right arm     Heart Rate 80 87 85 84 82     Temp 37.3 °C (99.1 °F) 36.2 °C (97.2 °F) 36.1 °C (97 °F) 36.3 °C (97.4 °F) 35.8 °C (96.5 °F)     Resp 16 15 15 21 20     Height 1.626 m (5' 4\")    1.626 m (5' 4\") 1.626 m (5' 4\") 1.626 m (5' 4\")   Weight (lb) 172    170.4 170 170   BMI 29.52 kg/m2    29.25 kg/m2 29.18 kg/m2 29.18 kg/m2   BSA (m2) 1.88 m2    1.87 m2 1.87 m2 1.87 m2   Visit Report     Report  Report      Body mass index is 29.18 kg/m².    GENERAL: A/Ox3, NAD. Appears healthy, well nourished  PSYCHIATRIC: Mood stable, appropriate memory recall  EYES: EOM intact, no scleral icterus  CARDIOVASCULAR: Palpable peripheral pulses  LUNGS: Breathing non-labored on room air  SKIN: no erythema, rashes, or ecchymosis     MUSCULOSKELETAL:  Laterality: right Shoulder Exam  - Negative Spurlings, full painless neck ROM  - Skin intact  - No erythema or warmth  - No ecchymosis or soft tissue swelling  - Shoulder ROM: Forward flexion 160, ER 45, IR upper lumbar  - Strength:      Jobes 5-/5     ER 5/5     Belly press and bearhug 5/5  - Palpation: Positive tenderness biceps groove and posterolateral acromion  - Lowe and Neers positive  - Speeds and O'Briens positive    NEUROVASCULAR:  - Neurovascular Status: sensation intact to " light touch distally, upper extremity motor grossly intact  - Capillary refill brisk at extremities, Bilateral peripheral pulses 2+    Imaging: Multiple views of the affected right shoulder(s) demonstrate: No significant osseous abnormality, no fracture, no significant degenerative change.   X-rays were personally reviewed and interpreted by me.  Radiology reports were reviewed by me as well, if readily available at the time.    L Inj/Asp: R subacromial bursa on 6/30/2025 1:37 PM  Indications: pain  Details: 25 G needle, posterior approach  Medications: 40 mg triamcinolone acetonide 40 mg/mL; 2 mL lidocaine 10 mg/mL (1 %)  Outcome: tolerated well, no immediate complications  Procedure, treatment alternatives, risks and benefits explained, specific risks discussed. Consent was given by the patient. Immediately prior to procedure a time out was called to verify the correct patient, procedure, equipment, support staff and site/side marked as required. Patient was prepped and draped in the usual sterile fashion.               Zack Wilkinson MD  Attending Surgeon    Sports Medicine Orthopaedic Surgery  [facility] [employer]          [1]   Past Medical History:  Diagnosis Date    Acute upper respiratory infection, unspecified 02/03/2020    Acute URI    Body mass index (BMI) 31.0-31.9, adult 12/11/2019    Body mass index (BMI) of 31.0 to 31.9 in adult    Body mass index (BMI) 32.0-32.9, adult 03/26/2021    Body mass index (BMI) of 32.0 to 32.9 in adult    Chronic constipation     Chronic kidney disease 3 years ago    GERD (gastroesophageal reflux disease)     Hypertension 3 years ago    Major depressive disorder, single episode, mild 03/26/2021    Depression, major, single episode, mild    Other abnormal and inconclusive findings on diagnostic imaging of breast 04/15/2021    Abnormal screening mammogram    Other  hypersomnia 03/26/2021    Excessive daytime sleepiness    Personal history of other specified conditions 09/25/2019    History of fatigue    Personal history of other specified conditions 09/25/2019    History of pelvic mass   [2] No Known Allergies  [3]   Past Surgical History:  Procedure Laterality Date    HYSTERECTOMY  2015    OTHER SURGICAL HISTORY  09/25/2019    Hysterectomy    OTHER SURGICAL HISTORY  02/09/2022    Kidney biopsy   [4]   Family History  Problem Relation Name Age of Onset    Diabetes Mother Gma     No Known Problems Father     [5]   Current Outpatient Medications   Medication Sig Dispense Refill    famotidine (Pepcid) 40 mg tablet Take 1 tablet (40 mg) by mouth 2 times a day. 180 tablet 3    losartan (Cozaar) 50 mg tablet Take 1 tablet (50 mg) by mouth once daily. 90 tablet 3    magnesium oxide 500 mg tablet Take by mouth.      semaglutide (OZEMPIC) 1 mg/dose (4 mg/3 mL) pen injector Inject 1 mg under the skin 1 (one) time per week. 6 mL 8    valACYclovir (Valtrex) 1 gram tablet Take 2 tabs (2000 mg) 2 times a days for 1 day. 4 tablet 5     No current facility-administered medications for this visit.

## 2025-07-03 RX ORDER — LIDOCAINE HYDROCHLORIDE 10 MG/ML
2 INJECTION, SOLUTION INFILTRATION; PERINEURAL
Status: COMPLETED | OUTPATIENT
Start: 2025-06-30 | End: 2025-06-30

## 2025-07-03 RX ORDER — TRIAMCINOLONE ACETONIDE 40 MG/ML
40 INJECTION, SUSPENSION INTRA-ARTICULAR; INTRAMUSCULAR
Status: COMPLETED | OUTPATIENT
Start: 2025-06-30 | End: 2025-06-30

## 2025-07-07 DIAGNOSIS — B00.1 RECURRENT HERPES LABIALIS: ICD-10-CM

## 2025-07-07 RX ORDER — VALACYCLOVIR HYDROCHLORIDE 1 G/1
1000 TABLET, FILM COATED ORAL DAILY
Qty: 90 TABLET | Refills: 3 | Status: SHIPPED | OUTPATIENT
Start: 2025-07-07

## 2025-07-07 NOTE — TELEPHONE ENCOUNTER
Patient with back to back recurrent herpes labialis. Rx valacylovir daily for suppression.   Shania Rincon, DO

## 2026-05-19 ENCOUNTER — APPOINTMENT (OUTPATIENT)
Dept: PRIMARY CARE | Facility: CLINIC | Age: 46
End: 2026-05-19
Payer: COMMERCIAL